# Patient Record
Sex: MALE | Race: WHITE | Employment: FULL TIME | URBAN - METROPOLITAN AREA
[De-identification: names, ages, dates, MRNs, and addresses within clinical notes are randomized per-mention and may not be internally consistent; named-entity substitution may affect disease eponyms.]

---

## 2020-04-30 ENCOUNTER — HOSPITAL ENCOUNTER (INPATIENT)
Age: 35
LOS: 6 days | Discharge: ANOTHER HOSPITAL | DRG: 287 | End: 2020-05-06
Attending: EMERGENCY MEDICINE | Admitting: HOSPITALIST
Payer: COMMERCIAL

## 2020-04-30 ENCOUNTER — APPOINTMENT (OUTPATIENT)
Dept: GENERAL RADIOLOGY | Age: 35
DRG: 287 | End: 2020-04-30
Attending: EMERGENCY MEDICINE
Payer: COMMERCIAL

## 2020-04-30 DIAGNOSIS — R07.9 CHEST PAIN: ICD-10-CM

## 2020-04-30 DIAGNOSIS — R06.09 DOE (DYSPNEA ON EXERTION): Primary | ICD-10-CM

## 2020-04-30 DIAGNOSIS — R73.9 HYPERGLYCEMIA: ICD-10-CM

## 2020-04-30 DIAGNOSIS — I50.23 ACUTE ON CHRONIC SYSTOLIC HEART FAILURE (HCC): ICD-10-CM

## 2020-04-30 DIAGNOSIS — R06.02 SOBOE (SHORTNESS OF BREATH ON EXERTION): ICD-10-CM

## 2020-04-30 DIAGNOSIS — R07.9 EXERTIONAL CHEST PAIN: ICD-10-CM

## 2020-04-30 DIAGNOSIS — I50.22 CHRONIC SYSTOLIC (CONGESTIVE) HEART FAILURE (HCC): ICD-10-CM

## 2020-04-30 PROBLEM — E11.9 TYPE 2 DIABETES MELLITUS (HCC): Status: ACTIVE | Noted: 2020-04-30

## 2020-04-30 LAB
ALBUMIN SERPL-MCNC: 3.7 G/DL (ref 3.4–5)
ALBUMIN/GLOB SERPL: 1.2 {RATIO} (ref 0.8–1.7)
ALP SERPL-CCNC: 79 U/L (ref 45–117)
ALT SERPL-CCNC: 62 U/L (ref 16–61)
ANION GAP SERPL CALC-SCNC: 2 MMOL/L (ref 3–18)
AST SERPL-CCNC: 25 U/L (ref 10–38)
ATRIAL RATE: 81 BPM
BASOPHILS # BLD: 0.1 K/UL (ref 0–0.1)
BASOPHILS NFR BLD: 1 % (ref 0–2)
BILIRUB SERPL-MCNC: 0.6 MG/DL (ref 0.2–1)
BNP SERPL-MCNC: 53 PG/ML (ref 0–450)
BUN SERPL-MCNC: 17 MG/DL (ref 7–18)
BUN/CREAT SERPL: 21 (ref 12–20)
CALCIUM SERPL-MCNC: 8.7 MG/DL (ref 8.5–10.1)
CALCULATED P AXIS, ECG09: 36 DEGREES
CALCULATED R AXIS, ECG10: -18 DEGREES
CALCULATED T AXIS, ECG11: -60 DEGREES
CHLORIDE SERPL-SCNC: 100 MMOL/L (ref 100–111)
CK MB CFR SERPL CALC: NORMAL % (ref 0–4)
CK MB CFR SERPL CALC: NORMAL % (ref 0–4)
CK MB SERPL-MCNC: <1 NG/ML (ref 5–25)
CK MB SERPL-MCNC: <1 NG/ML (ref 5–25)
CK SERPL-CCNC: 74 U/L (ref 39–308)
CK SERPL-CCNC: 80 U/L (ref 39–308)
CO2 SERPL-SCNC: 28 MMOL/L (ref 21–32)
CREAT SERPL-MCNC: 0.81 MG/DL (ref 0.6–1.3)
D DIMER PPP FEU-MCNC: 0.33 UG/ML(FEU)
DIAGNOSIS, 93000: NORMAL
DIFFERENTIAL METHOD BLD: ABNORMAL
EOSINOPHIL # BLD: 0.1 K/UL (ref 0–0.4)
EOSINOPHIL NFR BLD: 1 % (ref 0–5)
ERYTHROCYTE [DISTWIDTH] IN BLOOD BY AUTOMATED COUNT: 13.2 % (ref 11.6–14.5)
EST. AVERAGE GLUCOSE BLD GHB EST-MCNC: 312 MG/DL
GLOBULIN SER CALC-MCNC: 3.2 G/DL (ref 2–4)
GLUCOSE BLD STRIP.AUTO-MCNC: 255 MG/DL (ref 70–110)
GLUCOSE BLD STRIP.AUTO-MCNC: 313 MG/DL (ref 70–110)
GLUCOSE SERPL-MCNC: 344 MG/DL (ref 74–99)
HBA1C MFR BLD: 12.5 % (ref 4.2–5.6)
HCT VFR BLD AUTO: 43.9 % (ref 36–48)
HGB BLD-MCNC: 15.4 G/DL (ref 13–16)
LYMPHOCYTES # BLD: 1.6 K/UL (ref 0.9–3.6)
LYMPHOCYTES NFR BLD: 21 % (ref 21–52)
MAGNESIUM SERPL-MCNC: 2.2 MG/DL (ref 1.6–2.6)
MCH RBC QN AUTO: 28.5 PG (ref 24–34)
MCHC RBC AUTO-ENTMCNC: 35.1 G/DL (ref 31–37)
MCV RBC AUTO: 81.3 FL (ref 74–97)
MONOCYTES # BLD: 0.5 K/UL (ref 0.05–1.2)
MONOCYTES NFR BLD: 6 % (ref 3–10)
NEUTS SEG # BLD: 5.3 K/UL (ref 1.8–8)
NEUTS SEG NFR BLD: 71 % (ref 40–73)
P-R INTERVAL, ECG05: 148 MS
PLATELET # BLD AUTO: 245 K/UL (ref 135–420)
PMV BLD AUTO: 12.7 FL (ref 9.2–11.8)
POTASSIUM SERPL-SCNC: 3.6 MMOL/L (ref 3.5–5.5)
PROT SERPL-MCNC: 6.9 G/DL (ref 6.4–8.2)
Q-T INTERVAL, ECG07: 368 MS
QRS DURATION, ECG06: 94 MS
QTC CALCULATION (BEZET), ECG08: 427 MS
RBC # BLD AUTO: 5.4 M/UL (ref 4.7–5.5)
SODIUM SERPL-SCNC: 130 MMOL/L (ref 136–145)
TROPONIN I SERPL-MCNC: 0.02 NG/ML (ref 0–0.04)
TROPONIN I SERPL-MCNC: 0.04 NG/ML (ref 0–0.04)
VENTRICULAR RATE, ECG03: 81 BPM
WBC # BLD AUTO: 7.5 K/UL (ref 4.6–13.2)

## 2020-04-30 PROCEDURE — 74011636637 HC RX REV CODE- 636/637: Performed by: EMERGENCY MEDICINE

## 2020-04-30 PROCEDURE — 99218 HC RM OBSERVATION: CPT

## 2020-04-30 PROCEDURE — 71045 X-RAY EXAM CHEST 1 VIEW: CPT

## 2020-04-30 PROCEDURE — 65270000029 HC RM PRIVATE

## 2020-04-30 PROCEDURE — 83880 ASSAY OF NATRIURETIC PEPTIDE: CPT

## 2020-04-30 PROCEDURE — 74011636637 HC RX REV CODE- 636/637: Performed by: HOSPITALIST

## 2020-04-30 PROCEDURE — 36415 COLL VENOUS BLD VENIPUNCTURE: CPT

## 2020-04-30 PROCEDURE — 82962 GLUCOSE BLOOD TEST: CPT

## 2020-04-30 PROCEDURE — 94762 N-INVAS EAR/PLS OXIMTRY CONT: CPT

## 2020-04-30 PROCEDURE — 82550 ASSAY OF CK (CPK): CPT

## 2020-04-30 PROCEDURE — 85025 COMPLETE CBC W/AUTO DIFF WBC: CPT

## 2020-04-30 PROCEDURE — 99285 EMERGENCY DEPT VISIT HI MDM: CPT

## 2020-04-30 PROCEDURE — 93005 ELECTROCARDIOGRAM TRACING: CPT

## 2020-04-30 PROCEDURE — 83036 HEMOGLOBIN GLYCOSYLATED A1C: CPT

## 2020-04-30 PROCEDURE — 85379 FIBRIN DEGRADATION QUANT: CPT

## 2020-04-30 PROCEDURE — 74011250636 HC RX REV CODE- 250/636: Performed by: HOSPITALIST

## 2020-04-30 PROCEDURE — 96372 THER/PROPH/DIAG INJ SC/IM: CPT

## 2020-04-30 PROCEDURE — 80053 COMPREHEN METABOLIC PANEL: CPT

## 2020-04-30 PROCEDURE — 83735 ASSAY OF MAGNESIUM: CPT

## 2020-04-30 RX ORDER — INSULIN GLARGINE 100 [IU]/ML
5 INJECTION, SOLUTION SUBCUTANEOUS DAILY
Status: DISCONTINUED | OUTPATIENT
Start: 2020-05-01 | End: 2020-05-01

## 2020-04-30 RX ORDER — FUROSEMIDE 40 MG/1
40 TABLET ORAL DAILY
Status: DISCONTINUED | OUTPATIENT
Start: 2020-05-01 | End: 2020-04-30

## 2020-04-30 RX ORDER — NITROGLYCERIN 0.4 MG/1
0.4 TABLET SUBLINGUAL
Status: DISCONTINUED | OUTPATIENT
Start: 2020-04-30 | End: 2020-05-06 | Stop reason: HOSPADM

## 2020-04-30 RX ORDER — LOSARTAN POTASSIUM 50 MG/1
100 TABLET ORAL DAILY
Status: DISCONTINUED | OUTPATIENT
Start: 2020-05-01 | End: 2020-04-30

## 2020-04-30 RX ORDER — METOPROLOL SUCCINATE 50 MG/1
50 TABLET, EXTENDED RELEASE ORAL DAILY
Status: DISCONTINUED | OUTPATIENT
Start: 2020-05-01 | End: 2020-05-02

## 2020-04-30 RX ORDER — FUROSEMIDE 10 MG/ML
40 INJECTION INTRAMUSCULAR; INTRAVENOUS EVERY 12 HOURS
Status: DISCONTINUED | OUTPATIENT
Start: 2020-05-01 | End: 2020-05-04

## 2020-04-30 RX ORDER — MORPHINE SULFATE 2 MG/ML
1 INJECTION, SOLUTION INTRAMUSCULAR; INTRAVENOUS
Status: DISCONTINUED | OUTPATIENT
Start: 2020-04-30 | End: 2020-05-06 | Stop reason: HOSPADM

## 2020-04-30 RX ORDER — METOPROLOL SUCCINATE 50 MG/1
TABLET, EXTENDED RELEASE ORAL DAILY
COMMUNITY
End: 2020-05-06

## 2020-04-30 RX ORDER — ACETAMINOPHEN 325 MG/1
650 TABLET ORAL
Status: DISCONTINUED | OUTPATIENT
Start: 2020-04-30 | End: 2020-05-06 | Stop reason: HOSPADM

## 2020-04-30 RX ORDER — MAGNESIUM SULFATE 100 %
4 CRYSTALS MISCELLANEOUS AS NEEDED
Status: DISCONTINUED | OUTPATIENT
Start: 2020-04-30 | End: 2020-05-06 | Stop reason: HOSPADM

## 2020-04-30 RX ORDER — HEPARIN SODIUM 5000 [USP'U]/ML
5000 INJECTION, SOLUTION INTRAVENOUS; SUBCUTANEOUS EVERY 8 HOURS
Status: DISCONTINUED | OUTPATIENT
Start: 2020-04-30 | End: 2020-05-06 | Stop reason: HOSPADM

## 2020-04-30 RX ORDER — LOSARTAN POTASSIUM 50 MG/1
50 TABLET ORAL DAILY
Status: DISCONTINUED | OUTPATIENT
Start: 2020-05-01 | End: 2020-05-02

## 2020-04-30 RX ORDER — GUAIFENESIN 100 MG/5ML
81 LIQUID (ML) ORAL DAILY
Status: DISCONTINUED | OUTPATIENT
Start: 2020-05-01 | End: 2020-05-06 | Stop reason: HOSPADM

## 2020-04-30 RX ORDER — SPIRONOLACTONE 25 MG/1
25 TABLET ORAL DAILY
Status: DISCONTINUED | OUTPATIENT
Start: 2020-05-01 | End: 2020-05-06 | Stop reason: HOSPADM

## 2020-04-30 RX ORDER — INSULIN LISPRO 100 [IU]/ML
INJECTION, SOLUTION INTRAVENOUS; SUBCUTANEOUS
Status: DISCONTINUED | OUTPATIENT
Start: 2020-04-30 | End: 2020-05-06 | Stop reason: HOSPADM

## 2020-04-30 RX ADMIN — HUMAN INSULIN 10 UNITS: 100 INJECTION, SOLUTION SUBCUTANEOUS at 17:07

## 2020-04-30 RX ADMIN — INSULIN LISPRO 8 UNITS: 100 INJECTION, SOLUTION INTRAVENOUS; SUBCUTANEOUS at 21:47

## 2020-04-30 RX ADMIN — HEPARIN SODIUM 5000 UNITS: 5000 INJECTION INTRAVENOUS; SUBCUTANEOUS at 21:46

## 2020-04-30 NOTE — H&P
History & Physical    Patient: Jagdish Maldonado MRN: [id number]  CSN: [id number]    YOB: 1985  Age: 27 y.o. Sex: male      DOA: 4/30/2020  Primary Care Provider:  None      Assessment/Plan     Hospital Problems  Date Reviewed: 2/2/2015          Codes Class Noted POA    Chest pain ICD-10-CM: R07.9  ICD-9-CM: 786.50  4/30/2020 Unknown        Type 2 diabetes mellitus ([facility].) ICD-10-CM: E11.9  ICD-9-CM: 250.00  4/30/2020 Unknown        AICD (automatic cardioverter/defibrillator) present ICD-10-CM: Z95.810  ICD-9-CM: V45.02  9/19/2014 Yes    Overview Signed 9/19/2014  9:50 AM by Susy Iglesias MD     Dual chamber [employer] AICD for primary prevention 9/19/14             Essential hypertension, benign ICD-10-CM: I10  ICD-9-CM: 401.1  Unknown Yes        Cardiomyopathy ([facility] ICD-10-CM: I42.9  ICD-9-CM: 425.4  12/1/2011 Yes        CHF (congestive heart failure) ([facility].) ICD-10-CM: I50.9  ICD-9-CM: 428.0  12/1/2011 Yes                Admit to tele for obs     Chest pain-angina   Cardiac monitor, ce trend need to r/o acs   Ekg: no st change at this point,  Will give nc O2 , morphine /nitro prn for chest pain  Cardiology consult , echo   Last stress test several years ago   Need cath vs stress test will defer to cardiology     AICD and cardiomyopathy , chf combined diastolic and systolic   Will have echo  No aicd flares   chf stable   Continue lasix, arbs, bbb  And aspirin         DM type II , new? He denies has it , glucose was 400s in 2016  -received regular insulin in er   -start lantus  Check a1c , diabetic education   ssi, diabetic diet , hypoglycemia protocol         HTN, accelerated  Continue home medication. Hyponatremia -pseudo  Corrected 134-136, continue monitoring     Full code     Estimate  length of stay : 2-3 day    DVT : heparin   CC: chest pain        HPI:     Jagdish Maldonado is a 27 y.o. male with PMHX of hypertension, cardiomyopathy, AICD came to ER due to on and off chest pain for 2 weeks.   He reported he had chest pain on exertion, resolved per resting. Associated with shortness of breath. Chest pain located middle of the chest.  No radiation, it is dull pain. No leg swelling. He follow-up with Dr. Gus Cervantes cardiologist for this heart problem. He called cardiologist in the recommended to come to the hospital or office if symptoms become worse. Today, his chest pain became worsening while he was walking a dog. He denies any fever chills, no COVID 19 contact. His first troponin was normal, proBNP normal.  Chest  x-ray was clear. He was found glucose over 300. He denies any history of diabetes. He has no primary care physician. But he said he has a strong family history of diabetes. Visit Vitals  /73 (BP 1 Location: Right arm, BP Patient Position: At rest;Head of bed elevated (Comment degrees))   Pulse 81   Temp 98.5 °F (36.9 °C)   Resp 21   Ht 5' 3\" (1.6 m)   Wt 107 kg (236 lb)   SpO2 97%   BMI 41.81 kg/m²      O2 Device: Room air      Past Medical History:   Diagnosis Date    Bradycardia     Congestive heart failure, unspecified     with dilated cardiomyopathy, EF 15%    Elevated liver enzymes 2/2/2015    Essential hypertension, benign     Poor compliance 7/26/2014       Past Surgical History:   Procedure Laterality Date    HX HEART CATHETERIZATION      HX PACEMAKER      pacermaker/defib placed     fhx : DM -mother   Social History     Socioeconomic History    Marital status: SINGLE     Spouse name: Not on file    Number of children: Not on file    Years of education: Not on file    Highest education level: Not on file   Tobacco Use    Smoking status: Never Smoker    Smokeless tobacco: Never Used   Substance and Sexual Activity    Alcohol use: Yes     Comment: rarely    Drug use: No       Prior to Admission medications    Medication Sig Start Date End Date Taking? Authorizing Provider   metoprolol succinate (TOPROL-XL) 50 mg XL tablet Take  by mouth daily.    Yes Other, MD Gerson   losartan (COZAAR) 100 mg tablet Take 1 tablet by mouth daily. 1/8/15  Yes Paula Ramires MD   spironolactone (ALDACTONE) 25 mg tablet Take 1 Tab by mouth daily. 14  Yes Kip Shahid NP   furosemide (LASIX) 40 mg tablet Take 1 Tab by mouth daily. 14  Yes Kip Shahid NP       No Known Allergies    Review of Systems  Gen: No fever, chills, malaise, weight loss/gain. Heent: No headache, rhinorrhea, epistaxis, ear pain, hearing loss, sinus pain, neck pain/stiffness, sore throat. Heart: + chest pain, no palpitations, EMERSON, pnd, or orthopnea. Resp: No cough, hemoptysis, wheezing and +shortness of breath on exertion  GI: No nausea, vomiting, diarrhea, constipation, melena or hematochezia. : No urinary obstruction, dysuria or hematuria. Derm: No rash, new skin lesion or pruritis. Musc/skeletal: no bone or joint complains. Vasc: No edema, cyanosis or claudication. Endo: No heat/cold intolerance, no polyuria,polydipsia or polyphagia. Neuro: No unilateral weakness, numbness, tingling. No seizures. Heme: No easy bruising or bleeding. Physical Exam:     Physical Exam:  Visit Vitals  /73 (BP 1 Location: Right arm, BP Patient Position: At rest;Head of bed elevated (Comment degrees))   Pulse 81   Temp 98.5 °F (36.9 °C)   Resp 21   Ht 5' 3\" (1.6 m)   Wt 107 kg (236 lb)   SpO2 97%   BMI 41.81 kg/m²      O2 Device: Room air    Temp (24hrs), Av.5 °F (36.9 °C), Min:98.5 °F (36.9 °C), Max:98.5 °F (36.9 °C)    No intake/output data recorded. No intake/output data recorded. General:  Awake, cooperative, no distress. Head:  Normocephalic, without obvious abnormality, atraumatic. Eyes:  Conjunctivae/corneas clear, sclera anicteric, PERRL, EOMs intact. Nose: Nares normal. No drainage or sinus tenderness. Throat: Lips, mucosa, and tongue normal. .   Neck: Supple, symmetrical, trachea midline, no adenopathy. Lungs:   Clear to auscultation bilaterally. Heart:  Regular rate and rhythm, S1, S2 normal, no murmur, click, rub or gallop. Abdomen: Soft, non-tender. Bowel sounds normal. No masses,  No organomegaly. Extremities: Extremities normal, atraumatic, no cyanosis or edema. Pulses: 2+ and symmetric all extremities. Skin: Skin color-pink, texture, turgor normal. No rashes or lesions. Capillary refill normal    Neurologic: CNII-XII intact. No focal motor or sensory deficit.        Labs Reviewed:    BMP:   Lab Results   Component Value Date/Time     (L) 04/30/2020 04:15 PM    K 3.6 04/30/2020 04:15 PM     04/30/2020 04:15 PM    CO2 28 04/30/2020 04:15 PM    AGAP 2 (L) 04/30/2020 04:15 PM     (H) 04/30/2020 04:15 PM    BUN 17 04/30/2020 04:15 PM    CREA 0.81 04/30/2020 04:15 PM    GFRAA >60 04/30/2020 04:15 PM    GFRNA >60 04/30/2020 04:15 PM     CMP:   Lab Results   Component Value Date/Time     (L) 04/30/2020 04:15 PM    K 3.6 04/30/2020 04:15 PM     04/30/2020 04:15 PM    CO2 28 04/30/2020 04:15 PM    AGAP 2 (L) 04/30/2020 04:15 PM     (H) 04/30/2020 04:15 PM    BUN 17 04/30/2020 04:15 PM    CREA 0.81 04/30/2020 04:15 PM    GFRAA >60 04/30/2020 04:15 PM    GFRNA >60 04/30/2020 04:15 PM    CA 8.7 04/30/2020 04:15 PM    MG 2.2 04/30/2020 04:15 PM    ALB 3.7 04/30/2020 04:15 PM    TP 6.9 04/30/2020 04:15 PM    GLOB 3.2 04/30/2020 04:15 PM    AGRAT 1.2 04/30/2020 04:15 PM    SGOT 25 04/30/2020 04:15 PM    ALT 62 (H) 04/30/2020 04:15 PM     CBC:   Lab Results   Component Value Date/Time    WBC 7.5 04/30/2020 03:35 PM    HGB 15.4 04/30/2020 03:35 PM    HCT 43.9 04/30/2020 03:35 PM     04/30/2020 03:35 PM     All Cardiac Markers in the last 24 hours:   Lab Results   Component Value Date/Time    CPK 80 04/30/2020 04:15 PM    CKMB <1.0 04/30/2020 04:15 PM    CKND1  04/30/2020 04:15 PM     CALCULATION NOT PERFORMED WHEN RESULT IS BELOW LINEAR LIMIT    TROIQ 0.02 04/30/2020 04:15 PM     Recent Glucose Results:   Lab Results   Component Value Date/Time     (H) 04/30/2020 04:15 PM     ABG: No results found for: PH, PHI, PCO2, PCO2I, PO2, PO2I, HCO3, HCO3I, FIO2, FIO2I  COAGS: No results found for: APTT, PTP, INR, INREXT, INREXT  Liver Panel:   Lab Results   Component Value Date/Time    ALB 3.7 04/30/2020 04:15 PM    TP 6.9 04/30/2020 04:15 PM    GLOB 3.2 04/30/2020 04:15 PM    AGRAT 1.2 04/30/2020 04:15 PM    SGOT 25 04/30/2020 04:15 PM    ALT 62 (H) 04/30/2020 04:15 PM    AP 79 04/30/2020 04:15 PM     Pancreatic Markers: No results found for: AMYLPOCT, AML, LIPPOCT, LPSE    Xr Chest Port    Result Date: 4/30/2020  EXAM: XR CHEST PORT CLINICAL INDICATION/HISTORY: SOB -Additional: None COMPARISON: 9/20/14 TECHNIQUE: Portable frontal view of the chest _______________ FINDINGS: SUPPORT DEVICES: None. HEART AND MEDIASTINUM: Left chest wall AICD/pacemaker. Cardiomediastinal silhouette within normal limits. LUNGS AND PLEURAL SPACES: No dense consolidation, large effusion or pneumothorax. _______________     IMPRESSION: No acute cardiopulmonary abnormality.     Procedures/imaging: see electronic medical records for all procedures/Xrays and details which were not copied into this note but were reviewed prior to creation of Briana Miles MD, Internal Medicine     CC: None

## 2020-04-30 NOTE — Clinical Note
Improved with medication compliance.     Lab Results   Component Value Date    LDL 74 06/24/2019     (H) 12/20/2018     (H) 06/01/2018    HDL 45 06/24/2019    HDL 57 12/20/2018    HDL 52 06/01/2018    TRIG 98 06/24/2019       TRANSFER - OUT REPORT:     Verbal report given to: RN . Report consisted of patient's Situation, Background, Assessment and   Recommendations(SBAR). Opportunity for questions and clarification was provided. Patient transported with a Registered Nurse and [address] [place] [address] / [facility] [employer]. Patient transported to: Care unit.

## 2020-04-30 NOTE — ED PROVIDER NOTES
EMERGENCY DEPARTMENT HISTORY AND PHYSICAL EXAM    Date: 4/30/2020  Patient Name: Sneha Kan    History of Presenting Illness     Chief Complaint   Patient presents with    Shortness of Breath    Chest Pain         History Provided By: Patient    3:24 PM  Sneha Kan is a 27 y.o. male with PMHX of cardiomyopathy who presents to the emergency department C/O shortness of breath and chest pain. Patient states for the past 2 weeks he has had progressively worsening dyspnea on exertion and chest pain associated with it. He states normally he can walk his dog or go to 711 without the symptoms over the last 2 weeks are happening more frequently and getting more severe. The symptoms improve when he rests. He denies any fever, lower extremity edema, cough, sick contacts, recent travel. He had a tele-visit with his cardiologist yesterday who told him if symptoms are getting worse he should come to the emergency department. He reports he has been taking all of his medications as prescribed including his Lasix 3 times a day.      PCP: None    Current Facility-Administered Medications   Medication Dose Route Frequency Provider Last Rate Last Dose    [START ON 5/1/2020] aspirin chewable tablet 81 mg  81 mg Oral DAILY Ethyl Sever, MD        nitroglycerin (NITROSTAT) tablet 0.4 mg  0.4 mg SubLINGual Q5MIN PRN Ethyl Sever, MD        acetaminophen (TYLENOL) tablet 650 mg  650 mg Oral Q4H PRN Ethyl Sever, MD        morphine injection 1 mg  1 mg IntraVENous Q4H PRN Ethyl Sever, MD        heparin (porcine) injection 5,000 Units  5,000 Units SubCUTAneous Q8H Ethyl Sever, MD  Lendon Skeeter ON 5/1/2020] furosemide (LASIX) tablet 40 mg  40 mg Oral DAILY Ethyl Sever, MD  Lendon Skeeter ON 5/1/2020] losartan (COZAAR) tablet 100 mg  100 mg Oral DAILY Ethyl Sever, MD  Lendon Skeeter ON 5/1/2020] metoprolol succinate (TOPROL-XL) XL tablet 50 mg  50 mg Oral DAILY Ethyl Sever, MD        [START ON 5/1/2020] spironolactone (ALDACTONE) tablet 25 mg  25 mg Oral DAILY Nolan Herron Maria Mendoza MD        insulin lispro (HUMALOG) injection   SubCUTAneous AC&HS Mckenna Frausto MD        glucose chewable tablet 16 g  4 Tab Oral PRN Mckenna Frausto MD        glucagon [facility]) injection 1 mg  1 mg IntraMUSCular PRN Mckenna Frausto MD       [address] [place] [address] Taisha Rich ON 5/1/2020] insulin glargine (LANTUS) injection 5 Units  5 Units SubCUTAneous DAILY Mckenna Frausto MD         Current Outpatient Medications   Medication Sig Dispense Refill    metoprolol succinate (TOPROL-XL) 50 mg XL tablet Take  by mouth daily.  losartan (COZAAR) 100 mg tablet Take 1 tablet by mouth daily. 90 tablet 3    spironolactone (ALDACTONE) 25 mg tablet Take 1 Tab by mouth daily. 90 Tab 3    furosemide (LASIX) 40 mg tablet Take 1 Tab by mouth daily. 80 Tab 3       Past History     Past Medical History:  Past Medical History:   Diagnosis Date    Bradycardia     Congestive heart failure, unspecified     with dilated cardiomyopathy, EF 15%    Elevated liver enzymes 2/2/2015    Essential hypertension, benign     Poor compliance 7/26/2014       Past Surgical History:  Past Surgical History:   Procedure Laterality Date    HX HEART CATHETERIZATION      HX PACEMAKER      pacermaker/defib placed       Family History:  History reviewed. No pertinent family history. Social History:  Social History     Tobacco Use    Smoking status: Never Smoker    Smokeless tobacco: Never Used   Substance Use Topics    Alcohol use: Yes     Comment: rarely    Drug use: No       Allergies:  No Known Allergies      Review of Systems   Review of Systems   Constitutional: Negative for fever. Respiratory: Positive for shortness of breath. Negative for cough. Cardiovascular: Positive for chest pain. Negative for leg swelling. Gastrointestinal: Negative for abdominal pain. All other systems reviewed and are negative.         Physical Exam     Vitals:    04/30/20 1800 04/30/20 1815 04/30/20 1830 04/30/20 1845   BP: 142/90 (!) 150/98 137/89 (!) 128/103   Pulse: 80 77 69 82   Resp: 19 17 22 18   Temp:    97.1 °F (36.2 °C)   SpO2: 99% 98% 97% 98%   Weight:       Height:         Physical Exam    Nursing notes and vital signs reviewed    Constitutional: Non toxic appearing, mild distress  Head: Normocephalic, Atraumatic  Eyes: EOMI  Neck: Supple  Cardiovascular: Regular rate and rhythm, no murmurs, rubs, or gallops  Chest: Normal work of breathing and chest excursion bilaterally  Lungs: Diminished to ausculation bilaterally  Abdomen: Soft, non tender, non distended  Back: No evidence of trauma or deformity  Extremities: No evidence of trauma or deformity, no LE edema  Skin: Warm and dry, normal cap refill  Neuro: Alert and appropriate  Psychiatric: Normal mood and affect      Diagnostic Study Results     Labs -     Recent Results (from the past 12 hour(s))   EKG, 12 LEAD, INITIAL    Collection Time: 04/30/20  3:27 PM   Result Value Ref Range    Ventricular Rate 81 BPM    Atrial Rate 81 BPM    P-R Interval 148 ms    QRS Duration 94 ms    Q-T Interval 368 ms    QTC Calculation (Bezet) 427 ms    Calculated P Axis 36 degrees    Calculated R Axis -18 degrees    Calculated T Axis -60 degrees    Diagnosis       Normal sinus rhythm  Septal infarct , age undetermined  T wave abnormality, consider lateral ischemia  Abnormal ECG  When compared with ECG of 19-SEP-2014 06:28,  Septal infarct is now present     CBC WITH AUTOMATED DIFF    Collection Time: 04/30/20  3:35 PM   Result Value Ref Range    WBC 7.5 4.6 - 13.2 K/uL    RBC 5.40 4.70 - 5.50 M/uL    HGB 15.4 13.0 - 16.0 g/dL    HCT 43.9 36.0 - 48.0 %    MCV 81.3 74.0 - 97.0 FL    MCH 28.5 24.0 - 34.0 PG    MCHC 35.1 31.0 - 37.0 g/dL    RDW 13.2 11.6 - 14.5 %    PLATELET 553 282 - 397 K/uL    MPV 12.7 (H) 9.2 - 11.8 FL    NEUTROPHILS 71 40 - 73 %    LYMPHOCYTES 21 21 - 52 %    MONOCYTES 6 3 - 10 %    EOSINOPHILS 1 0 - 5 %    BASOPHILS 1 0 - 2 %    ABS. NEUTROPHILS 5.3 1.8 - 8.0 K/UL    ABS. LYMPHOCYTES 1.6 0.9 - 3.6 K/UL    ABS.  MONOCYTES 0.5 0.05 - 1.2 K/UL    ABS. EOSINOPHILS 0.1 0.0 - 0.4 K/UL    ABS. BASOPHILS 0.1 0.0 - 0.1 K/UL    DF AUTOMATED     D DIMER    Collection Time: 04/30/20  3:35 PM   Result Value Ref Range    D DIMER 0.33 <0.46 ug/ml(FEU)   CARDIAC PANEL,(CK, CKMB & TROPONIN)    Collection Time: 04/30/20  4:15 PM   Result Value Ref Range    CK 80 39 - 308 U/L    CK - MB <1.0 <3.6 ng/ml    CK-MB Index  0.0 - 4.0 %     CALCULATION NOT PERFORMED WHEN RESULT IS BELOW LINEAR LIMIT    Troponin-I, QT 0.02 0.0 - 0.045 NG/ML   MAGNESIUM    Collection Time: 04/30/20  4:15 PM   Result Value Ref Range    Magnesium 2.2 1.6 - 2.6 mg/dL   METABOLIC PANEL, COMPREHENSIVE    Collection Time: 04/30/20  4:15 PM   Result Value Ref Range    Sodium 130 (L) 136 - 145 mmol/L    Potassium 3.6 3.5 - 5.5 mmol/L    Chloride 100 100 - 111 mmol/L    CO2 28 21 - 32 mmol/L    Anion gap 2 (L) 3.0 - 18 mmol/L    Glucose 344 (H) 74 - 99 mg/dL    BUN 17 7.0 - 18 MG/DL    Creatinine 0.81 0.6 - 1.3 MG/DL    BUN/Creatinine ratio 21 (H) 12 - 20      GFR est AA >60 >60 ml/min/1.73m2    GFR est non-AA >60 >60 ml/min/1.73m2    Calcium 8.7 8.5 - 10.1 MG/DL    Bilirubin, total 0.6 0.2 - 1.0 MG/DL    ALT (SGPT) 62 (H) 16 - 61 U/L    AST (SGOT) 25 10 - 38 U/L    Alk. phosphatase 79 45 - 117 U/L    Protein, total 6.9 6.4 - 8.2 g/dL    Albumin 3.7 3.4 - 5.0 g/dL    Globulin 3.2 2.0 - 4.0 g/dL    A-G Ratio 1.2 0.8 - 1.7     NT-PRO BNP    Collection Time: 04/30/20  4:15 PM   Result Value Ref Range    NT pro-BNP 53 0 - 450 PG/ML   HEMOGLOBIN A1C WITH EAG    Collection Time: 04/30/20  4:15 PM   Result Value Ref Range    Hemoglobin A1c 12.5 (H) 4.2 - 5.6 %    Est. average glucose 312 mg/dL   GLUCOSE, POC    Collection Time: 04/30/20  6:44 PM   Result Value Ref Range    Glucose (POC) 255 (H) 70 - 110 mg/dL       Radiologic Studies -   XR CHEST PORT   Final Result   IMPRESSION:      No acute cardiopulmonary abnormality.         CT Results  (Last 48 hours)    None        CXR Results  (Last 48 hours) 04/30/20 1557  XR CHEST PORT Final result    Impression:  IMPRESSION:       No acute cardiopulmonary abnormality. Narrative:  EXAM: XR CHEST PORT       CLINICAL INDICATION/HISTORY: SOB   -Additional: None       COMPARISON: 9/20/14       TECHNIQUE: Portable frontal view of the chest       _______________       FINDINGS:       SUPPORT DEVICES: None. HEART AND MEDIASTINUM: Left chest wall AICD/pacemaker. Cardiomediastinal   silhouette within normal limits. LUNGS AND PLEURAL SPACES: No dense consolidation, large effusion or   pneumothorax.       _______________                 Medications given in the ED-  Medications   aspirin chewable tablet 81 mg (has no administration in time range)   nitroglycerin (NITROSTAT) tablet 0.4 mg (has no administration in time range)   acetaminophen (TYLENOL) tablet 650 mg (has no administration in time range)   morphine injection 1 mg (has no administration in time range)   heparin (porcine) injection 5,000 Units (has no administration in time range)   furosemide (LASIX) tablet 40 mg (has no administration in time range)   losartan (COZAAR) tablet 100 mg (has no administration in time range)   metoprolol succinate (TOPROL-XL) XL tablet 50 mg (has no administration in time range)   spironolactone (ALDACTONE) tablet 25 mg (has no administration in time range)   insulin lispro (HUMALOG) injection (has no administration in time range)   glucose chewable tablet 16 g (has no administration in time range)   glucagon (GLUCAGEN) injection 1 mg (has no administration in time range)   insulin glargine (LANTUS) injection 5 Units (has no administration in time range)   insulin regular (NOVOLIN R, HUMULIN R) injection 10 Units (10 Units SubCUTAneous Given 4/30/20 1707)         Medical Decision Making   I am the first provider for this patient.     I reviewed the vital signs, available nursing notes, past medical history, past surgical history, family history and social history. Vital Signs-Reviewed the patient's vital signs. Pulse Oximetry Analysis -98 % on room air    Cardiac Monitor:  Rate: 77 bpm  Rhythm: Normal sinus    EKG interpretation: (Preliminary)  EKG read by Dr. Jamar Chua at 3:31 PM  Normal sinus rhythm at a rate of 81 bpm, SD interval 148 ms, QRS duration of 94 ms    Records Reviewed: Nursing Notes, Old Medical Records and Previous electrocardiograms    Provider Notes (Medical Decision Making): Gorman Burkitt is a 27 y.o. male ending for progressively worsening dyspnea on exertion and chest pain with exertion over the past 2 weeks. Patient has significant cardiomyopathy history but reports compliance with all his medications. Glucose significantly elevated here without evidence of DKA and patient denies prior diagnosis of diabetes. Discussed with cardiology and hospitalist for further observation in hospital for evaluation and management. Patient understands and agrees with this plan. Procedures:  Procedures    ED Course:   CONSULT NOTE:   5:39 PM  Dr. Jamar Chua spoke with Dr. Osiel Bell   Specialty: Cardiology  Discussed pt's hx, disposition, and available diagnostic and imaging results over the telephone. Reviewed care plans. Observe overnight. 5:42 PM  Updated patient on all results and plan. All questions answered. CONSULT NOTE:   5:49 PM  Dr. Jamar Chua spoke with Dr. Jelly Jaime  Specialty: Hospitalist  Discussed pt's hx, disposition, and available diagnostic and imaging results over the telephone. Reviewed care plans. Accepts for observation on telemetry. Diagnosis and Disposition     Critical Care Time: None    Core Measures:  For Hospitalized Patients:    1. Hospitalization Decision Time:  The decision to hospitalize the patient was made by Dr. Jamar Chua at 5:39 PM on 4/30/2020    2.  Aspirin: Aspirin was not given because the patient did not present with a stroke at the time of their Emergency Department evaluation    5:48 PM Patient is being admitted to the hospital by Dr. Alessandro Lopez. The results of their tests and reasons for their admission have been discussed with them and/or available family. They convey agreement and understanding for the need to be admitted and for their admission diagnosis. CONDITIONS ON ADMISSION:  Sepsis is not present at the time of admission. Deep Vein Thrombosis is not present at the time of admission. Thrombosis is not present at the time of admission. Urinary Tract Infection is not present at the time of admission. Pneumonia is not present at the time of admission. MRSA is not present at the time of admission. Wound infection is not present at the time of admission. Pressure Ulcer is not present at the time of admission. CLINICAL IMPRESSION:    1. EMERSON (dyspnea on exertion)    2. Hyperglycemia    3. Exertional chest pain      _______________________________      Please note that this dictation was completed with [employer], the computer voice recognition software. Quite often unanticipated grammatical, syntax, homophones, and other interpretive errors are inadvertently transcribed by the computer software. Please disregard these errors. Please excuse any errors that have escaped final proofreading.

## 2020-04-30 NOTE — ROUTINE PROCESS
1854 TRANSFER - IN REPORT: 
 
TRANSFER - IN REPORT: 
 
Verbal report received from Oli Santos (name) on Gerson Charlton  being received from ED (unit) for routine progression of care Report consisted of patients Situation, Background, Assessment and  
Recommendations(SBAR). Information from the following report(s) SBAR, Kardex, [facility] and Cardiac Rhythm SR was reviewed with the receiving nurse. Opportunity for questions and clarification was provided. Assessment completed upon patients arrival to unit and care assumed. 1912 Pt arrived on the unit. Pt oriented to call bell. No c/o sob or chest pain. Call bell within reach 
 
1920 Bedside and Verbal shift change report given to Fede Arciniega RN (oncoming nurse) by Isabel Albert RN (offgoing nurse). Report included the following information SBAR, Kardex, Intake/Output, MAR, Recent Results and Cardiac Rhythm .

## 2020-04-30 NOTE — ED NOTES
TRANSFER - OUT REPORT:    Verbal report given to Ailyn Bo RN(name) on Getachew Marcelino  being transferred to [facility](unit) for routine progression of care       Report consisted of patients Situation, Background, Assessment and   Recommendations(SBAR). Information from the following report(s) SBAR, Kardex, ED Summary, [facility] and Recent Results was reviewed with the receiving nurse. Lines:   Peripheral IV 04/30/20 Left Forearm (Active)   Site Assessment Clean, dry, & intact 4/30/2020  3:41 PM   Phlebitis Assessment 0 4/30/2020  3:41 PM   Infiltration Assessment 0 4/30/2020  3:41 PM   Dressing Status Clean, dry, & intact 4/30/2020  3:41 PM   Dressing Type Transparent;Tape 4/30/2020  3:41 PM   Hub Color/Line Status Pink;Flushed;Patent 4/30/2020  3:41 PM   Action Taken Blood drawn 4/30/2020  3:41 PM   Alcohol Cap Used Yes 4/30/2020  3:41 PM        Opportunity for questions and clarification was provided.       Patient transported with:   Monitor  Registered Nurse

## 2020-04-30 NOTE — ED TRIAGE NOTES
Patient with complaints of shortness of breath with activity and chest pain.  Patient states he was told to come here by Dr. Hou Boehringer

## 2020-04-30 NOTE — Clinical Note
TRANSFER - IN REPORT:     Verbal report received from: rn.     Report consisted of patient's Situation, Background, Assessment and   Recommendations(SBAR). Opportunity for questions and clarification was provided. Assessment completed upon patient's arrival to unit and care assumed. Patient transported with a Registered Nurse and [address] [place] [address] / [facility] [employer].

## 2020-05-01 ENCOUNTER — APPOINTMENT (OUTPATIENT)
Dept: NON INVASIVE DIAGNOSTICS | Age: 35
DRG: 287 | End: 2020-05-01
Attending: HOSPITALIST
Payer: COMMERCIAL

## 2020-05-01 PROBLEM — E11.65 HYPERGLYCEMIA DUE TO TYPE 2 DIABETES MELLITUS (HCC): Status: ACTIVE | Noted: 2020-04-30

## 2020-05-01 LAB
ANION GAP SERPL CALC-SCNC: 4 MMOL/L (ref 3–18)
BUN SERPL-MCNC: 17 MG/DL (ref 7–18)
BUN/CREAT SERPL: 19 (ref 12–20)
CALCIUM SERPL-MCNC: 8.5 MG/DL (ref 8.5–10.1)
CHLORIDE SERPL-SCNC: 99 MMOL/L (ref 100–111)
CHOLEST SERPL-MCNC: 218 MG/DL
CK MB CFR SERPL CALC: NORMAL % (ref 0–4)
CK MB CFR SERPL CALC: NORMAL % (ref 0–4)
CK MB SERPL-MCNC: <1 NG/ML (ref 5–25)
CK MB SERPL-MCNC: <1 NG/ML (ref 5–25)
CK SERPL-CCNC: 59 U/L (ref 39–308)
CK SERPL-CCNC: 65 U/L (ref 39–308)
CO2 SERPL-SCNC: 33 MMOL/L (ref 21–32)
CREAT SERPL-MCNC: 0.88 MG/DL (ref 0.6–1.3)
GLUCOSE BLD STRIP.AUTO-MCNC: 255 MG/DL (ref 70–110)
GLUCOSE BLD STRIP.AUTO-MCNC: 258 MG/DL (ref 70–110)
GLUCOSE BLD STRIP.AUTO-MCNC: 262 MG/DL (ref 70–110)
GLUCOSE BLD STRIP.AUTO-MCNC: 377 MG/DL (ref 70–110)
GLUCOSE SERPL-MCNC: 239 MG/DL (ref 74–99)
HDLC SERPL-MCNC: 27 MG/DL (ref 40–60)
HDLC SERPL: 8.1 {RATIO} (ref 0–5)
LDLC SERPL CALC-MCNC: 142.2 MG/DL (ref 0–100)
LIPID PROFILE,FLP: ABNORMAL
POTASSIUM SERPL-SCNC: 3.6 MMOL/L (ref 3.5–5.5)
SODIUM SERPL-SCNC: 136 MMOL/L (ref 136–145)
TRIGL SERPL-MCNC: 244 MG/DL (ref ?–150)
TROPONIN I SERPL-MCNC: 0.02 NG/ML (ref 0–0.04)
TROPONIN I SERPL-MCNC: 0.04 NG/ML (ref 0–0.04)
VLDLC SERPL CALC-MCNC: 48.8 MG/DL

## 2020-05-01 PROCEDURE — 36415 COLL VENOUS BLD VENIPUNCTURE: CPT

## 2020-05-01 PROCEDURE — 74011250637 HC RX REV CODE- 250/637: Performed by: INTERNAL MEDICINE

## 2020-05-01 PROCEDURE — 74011250636 HC RX REV CODE- 250/636: Performed by: INTERNAL MEDICINE

## 2020-05-01 PROCEDURE — 82550 ASSAY OF CK (CPK): CPT

## 2020-05-01 PROCEDURE — 74011250636 HC RX REV CODE- 250/636: Performed by: HOSPITALIST

## 2020-05-01 PROCEDURE — 82962 GLUCOSE BLOOD TEST: CPT

## 2020-05-01 PROCEDURE — 74011250637 HC RX REV CODE- 250/637: Performed by: HOSPITALIST

## 2020-05-01 PROCEDURE — 80048 BASIC METABOLIC PNL TOTAL CA: CPT

## 2020-05-01 PROCEDURE — 96372 THER/PROPH/DIAG INJ SC/IM: CPT

## 2020-05-01 PROCEDURE — 96376 TX/PRO/DX INJ SAME DRUG ADON: CPT

## 2020-05-01 PROCEDURE — 65270000029 HC RM PRIVATE

## 2020-05-01 PROCEDURE — 80061 LIPID PANEL: CPT

## 2020-05-01 PROCEDURE — 74011636637 HC RX REV CODE- 636/637: Performed by: HOSPITALIST

## 2020-05-01 PROCEDURE — 96374 THER/PROPH/DIAG INJ IV PUSH: CPT

## 2020-05-01 PROCEDURE — C8929 TTE W OR WO FOL WCON,DOPPLER: HCPCS

## 2020-05-01 PROCEDURE — 99218 HC RM OBSERVATION: CPT

## 2020-05-01 RX ORDER — INSULIN LISPRO 100 [IU]/ML
5 INJECTION, SOLUTION INTRAVENOUS; SUBCUTANEOUS
Status: DISCONTINUED | OUTPATIENT
Start: 2020-05-01 | End: 2020-05-04

## 2020-05-01 RX ORDER — INSULIN GLARGINE 100 [IU]/ML
15 INJECTION, SOLUTION SUBCUTANEOUS DAILY
Status: DISCONTINUED | OUTPATIENT
Start: 2020-05-02 | End: 2020-05-03

## 2020-05-01 RX ORDER — INSULIN GLARGINE 100 [IU]/ML
5 INJECTION, SOLUTION SUBCUTANEOUS ONCE
Status: COMPLETED | OUTPATIENT
Start: 2020-05-01 | End: 2020-05-01

## 2020-05-01 RX ADMIN — HEPARIN SODIUM 5000 UNITS: 5000 INJECTION INTRAVENOUS; SUBCUTANEOUS at 06:50

## 2020-05-01 RX ADMIN — INSULIN LISPRO 8 UNITS: 100 INJECTION, SOLUTION INTRAVENOUS; SUBCUTANEOUS at 11:52

## 2020-05-01 RX ADMIN — INSULIN LISPRO 6 UNITS: 100 INJECTION, SOLUTION INTRAVENOUS; SUBCUTANEOUS at 16:57

## 2020-05-01 RX ADMIN — PERFLUTREN 1 ML: 6.52 INJECTION, SUSPENSION INTRAVENOUS at 08:48

## 2020-05-01 RX ADMIN — ASPIRIN 81 MG 81 MG: 81 TABLET ORAL at 09:29

## 2020-05-01 RX ADMIN — INSULIN GLARGINE 5 UNITS: 100 INJECTION, SOLUTION SUBCUTANEOUS at 16:58

## 2020-05-01 RX ADMIN — INSULIN LISPRO 5 UNITS: 100 INJECTION, SOLUTION INTRAVENOUS; SUBCUTANEOUS at 16:58

## 2020-05-01 RX ADMIN — SPIRONOLACTONE 25 MG: 25 TABLET ORAL at 09:29

## 2020-05-01 RX ADMIN — INSULIN LISPRO 6 UNITS: 100 INJECTION, SOLUTION INTRAVENOUS; SUBCUTANEOUS at 21:46

## 2020-05-01 RX ADMIN — HEPARIN SODIUM 5000 UNITS: 5000 INJECTION INTRAVENOUS; SUBCUTANEOUS at 14:07

## 2020-05-01 RX ADMIN — FUROSEMIDE 40 MG: 10 INJECTION, SOLUTION INTRAMUSCULAR; INTRAVENOUS at 21:47

## 2020-05-01 RX ADMIN — HEPARIN SODIUM 5000 UNITS: 5000 INJECTION INTRAVENOUS; SUBCUTANEOUS at 22:11

## 2020-05-01 RX ADMIN — METOPROLOL SUCCINATE 50 MG: 50 TABLET, EXTENDED RELEASE ORAL at 09:30

## 2020-05-01 RX ADMIN — FUROSEMIDE 40 MG: 10 INJECTION, SOLUTION INTRAMUSCULAR; INTRAVENOUS at 09:31

## 2020-05-01 RX ADMIN — LOSARTAN POTASSIUM 50 MG: 50 TABLET, FILM COATED ORAL at 09:29

## 2020-05-01 RX ADMIN — INSULIN GLARGINE 5 UNITS: 100 INJECTION, SOLUTION SUBCUTANEOUS at 09:30

## 2020-05-01 RX ADMIN — INSULIN LISPRO 6 UNITS: 100 INJECTION, SOLUTION INTRAVENOUS; SUBCUTANEOUS at 06:52

## 2020-05-01 NOTE — ROUTINE PROCESS
Bedside shift change report given to Michelle Jo RN (oncoming nurse) by Magdy Cintron RN (offgoing nurse). Report included the following information SBAR, Kardex, Intake/Output and MAR.

## 2020-05-01 NOTE — PROGRESS NOTES
Hospitalist Progress Note-critical care note     Patient: Marycarmen Weeks MRN: [id number]  CSN: [id number]    YOB: 1985  Age: 27 y.o. Sex: male    DOA: 4/30/2020 LOS:  LOS: 0 days            Chief complaint: chest pain , dm type II, HTN, cardiomyopathy. chf     Assessment/Plan         Hospital Problems  Date Reviewed: 2/2/2015          Codes Class Noted POA    * (Principal) Chest pain ICD-10-CM: R07.9  ICD-9-CM: 786.50  4/30/2020 Unknown        Hyperglycemia due to type 2 diabetes mellitus ([facility] [address] ICD-10-CM: E11.65  ICD-9-CM: 250.00  4/30/2020         AICD (automatic cardioverter/defibrillator) present ICD-10-CM: Z95.810  ICD-9-CM: V45.02  9/19/2014 Yes    Overview Signed 9/19/2014  9:50 AM by Vipin Rudd MD     Dual chamber Medtronic AICD for primary prevention 9/19/14             Essential hypertension, benign ICD-10-CM: I10  ICD-9-CM: 401.1  Unknown Yes        Cardiomyopathy ([facility] [address] ICD-10-CM: I42.9  ICD-9-CM: 425.4  12/1/2011 Yes        Combined systolic and diastolic congestive heart failure ([facility] ICD-10-CM: I50.40  ICD-9-CM: 428.40  12/1/2011 Yes              Chest pain-angina   No chest pain overnight   No acs   Echo done ,  Case discussed with Dr. Stevie Pacheco     AICD and cardiomyopathy , chf combined diastolic and systolic   Echo done -results pending   No aicd flares   chf stable   Continue lasix, arbs, bbb  And aspirin            DM type II , hyperglycemia   -increase lantus to 15. premeal 5   ssi, diabetic diet , hypoglycemia protocol             HTN, accelerated  Continue home medication.     Hyponatremia -pseudo  Resolved     Subjective: no chest pain   rn : took his own meds AM     Disposition :tbd,   Review of systems:    General: No fevers or chills. Cardiovascular: No chest pain or pressure. No palpitations. Pulmonary: No shortness of breath. Gastrointestinal: No nausea, vomiting.      Vital signs/Intake and Output:  Visit Vitals  /83   Pulse 82   Temp 97.7 °F (36.5 °C)   Resp 18 Ht 5' 3\" (1.6 m)   Wt 96.6 kg (213 lb)   SpO2 99%   BMI 37.73 kg/m²     Current Shift:  05/01 0701 - 05/01 1900  In: 240 [P.O.:240]  Out: 1050 [Urine:1050]  Last three shifts:  04/29 1901 - 05/01 0700  In: 480 [P.O.:480]  Out: 0     Physical Exam:  General: WD, WN. Alert, cooperative, no acute distress    HEENT: NC, Atraumatic. PERRLA, anicteric sclerae. Lungs: CTA Bilaterally. No Wheezing/Rhonchi/Rales. Heart:  Regular  rhythm,  No murmur, No Rubs, No Gallops  Abdomen: Soft, Non distended, Non tender. +Bowel sounds,   Extremities: No c/c/e  Psych:   Not anxious or agitated. Neurologic:  No acute neurological deficit. Labs: Results:       Chemistry Recent Labs     05/01/20  0450 04/30/20  1615   * 344*    130*   K 3.6 3.6   CL 99* 100   CO2 33* 28   BUN 17 17   CREA 0.88 0.81   CA 8.5 8.7   AGAP 4 2*   BUCR 19 21*   AP  --  79   TP  --  6.9   ALB  --  3.7   GLOB  --  3.2   AGRAT  --  1.2      CBC w/Diff Recent Labs     04/30/20  1535   WBC 7.5   RBC 5.40   HGB 15.4   HCT 43.9      GRANS 71   LYMPH 21   EOS 1      Cardiac Enzymes Recent Labs     05/01/20  1115 05/01/20  0450   CPK 65 59   CKND1 CALCULATION NOT PERFORMED WHEN RESULT IS BELOW LINEAR LIMIT CALCULATION NOT PERFORMED WHEN RESULT IS BELOW LINEAR LIMIT      Coagulation No results for input(s): PTP, INR, APTT, INREXT in the last 72 hours. Lipid Panel Lab Results   Component Value Date/Time    Cholesterol, total 218 (H) 05/01/2020 04:50 AM    HDL Cholesterol 27 (L) 05/01/2020 04:50 AM    LDL, calculated 142.2 (H) 05/01/2020 04:50 AM    VLDL, calculated 48.8 05/01/2020 04:50 AM    Triglyceride 244 (H) 05/01/2020 04:50 AM    CHOL/HDL Ratio 8.1 (H) 05/01/2020 04:50 AM      BNP No results for input(s): BNPP in the last 72 hours.    Liver Enzymes Recent Labs     04/30/20  1615   TP 6.9   ALB 3.7   AP 79   SGOT 25      Thyroid Studies Lab Results   Component Value Date/Time    TSH 1.54 11/17/2011 01:20 AM Procedures/imaging: see electronic medical records for all procedures/Xrays and details which were not copied into this note but were reviewed prior to creation of Plan    Xr Chest Port    Result Date: 4/30/2020  EXAM: XR CHEST PORT CLINICAL INDICATION/HISTORY: SOB -Additional: None COMPARISON: 9/20/14 TECHNIQUE: Portable frontal view of the chest _______________ FINDINGS: SUPPORT DEVICES: None. HEART AND MEDIASTINUM: Left chest wall AICD/pacemaker. Cardiomediastinal silhouette within normal limits. LUNGS AND PLEURAL SPACES: No dense consolidation, large effusion or pneumothorax. _______________     IMPRESSION: No acute cardiopulmonary abnormality.       Chelle Murray MD

## 2020-05-01 NOTE — PROGRESS NOTES
Reason for Admission:   Sharee Velasco is a 27 y.o. male with PMHX of cardiomyopathy who presents to the emergency department C/O shortness of breath and chest pain. Patient states for the past 2 weeks he has had progressively worsening dyspnea on exertion and chest pain associated with it. He states normally he can walk his dog or go to [address] without the symptoms over the last 2 weeks are happening more frequently and getting more severe. The symptoms improve when he rests. He denies any fever, lower extremity edema, cough, sick contacts, recent travel. He had a tele-visit with his cardiologist yesterday who told him if symptoms are getting worse he should come to the emergency department. He reports he has been taking all of his medications as prescribed including his Lasix 3 times a day. RUR Score:    Not on chart                 Plan for utilizing home health:      no    PCP: First and Last name:  Does not have a pcp   Name of Practice:    Are you a current patient: Yes/No:    Approximate date of last visit:                     Current Advanced Directive/Advance Care Plan: fani declines need of assistance with acp however states he is is aware that it would be his mother since she is his next of kin                         Transition of Care Plan:        Did not enter room due to covid restrictions . Cm did call patient on the phone. He reports he lives with his sister. Reports he does not use [facility]. He is [employer]AD[employer], he has [employer] for insurance. Reports he does not want to address acp. Reports if anything happens to him his mother will make his decisions. reports his mother will drive him home. Verified address on EMR  He does not have pcp. Cms will assist with pcp.   Cm will continue to follow  Care Management Interventions  PCP Verified by CM: No(cms will assist)  Transition of Care Consult (CM Consult): Discharge Planning  Current Support Network: Relative's Home  Confirm Follow Up Transport: Family  The Plan for Transition of Care is Related to the Following Treatment Goals : anticipate d/c home when medically cleared  The Patient and/or Patient Representative was Provided with a Choice of Provider and Agrees with the Discharge Plan?: Yes  Freedom of Choice List was Provided with Basic Dialogue that Supports the Patient's Individualized Plan of Care/Goals, Treatment Preferences and Shares the Quality Data Associated with the Providers?: Yes  Discharge Location  Discharge Placement: Home with family assistance

## 2020-05-01 NOTE — PROGRESS NOTES
Problem: Falls - Risk of  Goal: *Absence of Falls  Description: Document Brody Syeda Fall Risk and appropriate interventions in the flowsheet.   Outcome: Progressing Towards Goal  Note: Fall Risk Interventions:            Medication Interventions: Evaluate medications/consider consulting pharmacy, Teach patient to arise slowly                   Problem: Patient Education: Go to Patient Education Activity  Goal: Patient/Family Education  Outcome: Progressing Towards Goal

## 2020-05-01 NOTE — PROGRESS NOTES
1920 Pt received from offgoing nurse without any signs or symptoms of distress. Pt vitals are stable and within normal limits. Pt bed in low position with wheels locked and call bell within reach. 1948 Assessment completed and documented in flow sheet. Pt denies any further needs at this time. Pt in NAD with bed in low position, wheels locked and call bell within reach. Primary Nurse Kvng Mims, MECCA and KIA Shell RN, RN performed a dual skin assessment on this patient No impairment noted  Diego score is 23  Purposeful rounding completed. Pt resting quietly. No further needs voiced at this time. 2146 Scheduled medications administered as ordered. Purposeful rounding completed. Pt resting quietly. No further needs voiced at this time. 0030 Purposeful rounding completed. Pt resting quietly. No further needs voiced at this time. 0230 Purposeful rounding completed. Pt resting quietly. No further needs voiced at this time. 5744 Reassessment completed with no changes noted. Bed locked, in lowest position, with call light within reach. Purposeful rounding completed. Pt resting quietly. No further needs voiced at this time.

## 2020-05-01 NOTE — PROGRESS NOTES
0700: Assumed care of pt from [facility].  4724: Entered pt room to give medication with Armando Levine RN. Pt stated he took all his medication his morning ( Home medication). This RN and Armando Levine RN reviewed medication at bedside as those that were ordered by MD. Educated pt not to take home medication anymore til discharge. Pt stated he understood. Will notify MD.  [phone]: Pt rounded on medication given tray taken. Needs met. 1845: Dr. Toan Wong stated pt will be here over the weekend Stress test on Monday.

## 2020-05-02 PROBLEM — E78.5 HYPERLIPEMIA: Status: ACTIVE | Noted: 2020-05-02

## 2020-05-02 PROBLEM — E66.01 MORBID OBESITY (HCC): Status: ACTIVE | Noted: 2020-05-02

## 2020-05-02 LAB
ANION GAP SERPL CALC-SCNC: 5 MMOL/L (ref 3–18)
AV VELOCITY RATIO: 0.51
AV VTI RATIO: 0.5
BUN SERPL-MCNC: 14 MG/DL (ref 7–18)
BUN/CREAT SERPL: 15 (ref 12–20)
CALCIUM SERPL-MCNC: 9.5 MG/DL (ref 8.5–10.1)
CHLORIDE SERPL-SCNC: 95 MMOL/L (ref 100–111)
CO2 SERPL-SCNC: 32 MMOL/L (ref 21–32)
CREAT SERPL-MCNC: 0.93 MG/DL (ref 0.6–1.3)
ECHO AO ASC DIAM: 3.26 CM
ECHO AV AREA PEAK VELOCITY: 1.7 CM2
ECHO AV AREA VTI: 1.6 CM2
ECHO AV AREA/BSA PEAK VELOCITY: 0.8 CM2/M2
ECHO AV AREA/BSA VTI: 0.7 CM2/M2
ECHO AV MEAN GRADIENT: 2.9 MMHG
ECHO AV MEAN VELOCITY: 0.81 M/S
ECHO AV PEAK GRADIENT: 5.5 MMHG
ECHO AV PEAK VELOCITY: 117.42 CM/S
ECHO AV VTI: 21.51 CM
ECHO IVC PROX: 1.65 CM
ECHO LA AREA 2C: 13.32 CM2
ECHO LA AREA 4C: 12.5 CM2
ECHO LA VOL 2C: 34.26 ML (ref 18–58)
ECHO LA VOL 4C: 28.37 ML (ref 18–58)
ECHO LA VOL BP: 33.29 ML (ref 18–58)
ECHO LA VOLUME INDEX A2C: 17.25 ML/M2 (ref 16–28)
ECHO LA VOLUME INDEX A4C: 14.28 ML/M2 (ref 16–28)
ECHO LV E' LATERAL VELOCITY: 7 CM/S
ECHO LV E' SEPTAL VELOCITY: 6 CM/S
ECHO LV EDV A2C: 97.2 ML
ECHO LV EDV A4C: 156.5 ML
ECHO LV EDV BP: 124.6 ML (ref 67–155)
ECHO LV EDV INDEX A4C: 78.8 ML/M2
ECHO LV EDV INDEX BP: 62.7 ML/M2
ECHO LV EDV NDEX A2C: 48.9 ML/M2
ECHO LV EDV TEICHHOLZ: 1.27 ML
ECHO LV EJECTION FRACTION A2C: 28 %
ECHO LV EJECTION FRACTION A4C: 31 %
ECHO LV EJECTION FRACTION BIPLANE: 29.3 % (ref 55–100)
ECHO LV ESV A2C: 69.7 ML
ECHO LV ESV A4C: 108.5 ML
ECHO LV ESV BP: 88.1 ML (ref 22–58)
ECHO LV ESV INDEX A2C: 35.1 ML/M2
ECHO LV ESV INDEX A4C: 54.6 ML/M2
ECHO LV ESV INDEX BP: 44.4 ML/M2
ECHO LV ESV TEICHHOLZ: 0.99 ML
ECHO LV INTERNAL DIMENSION DIASTOLIC: 6.33 CM (ref 4.2–5.9)
ECHO LV INTERNAL DIMENSION SYSTOLIC: 5.67 CM
ECHO LV IVSD: 0.93 CM (ref 0.6–1)
ECHO LV POSTERIOR WALL DIASTOLIC: 1.03 CM (ref 0.6–1)
ECHO LVOT DIAM: 2.04 CM
ECHO LVOT PEAK GRADIENT: 1.4 MMHG
ECHO LVOT PEAK VELOCITY: 60.19 CM/S
ECHO LVOT VTI: 10.35 CM
ECHO MV A VELOCITY: 59 CM/S
ECHO MV AREA PHT: 3.2 CM2
ECHO MV E DECELERATION TIME (DT): 234.7 MS
ECHO MV E VELOCITY: 69.05 CM/S
ECHO MV E/A RATIO: 1.17
ECHO MV E/E' LATERAL: 9.86
ECHO MV E/E' RATIO (AVERAGED): 10.69
ECHO MV E/E' SEPTAL: 11.51
ECHO MV PRESSURE HALF TIME (PHT): 68.1 MS
ECHO RA AREA 4C: 12.44 CM2
ECHO RA VOLUME: 32.9 ML
ECHO RV INTERNAL DIMENSION: 3.63 CM
ECHO TRICUSPID ANNULAR PEAK SYSTOLIC VELOCITY: 2.6 CM/S
ECHO TV REGURGITANT MAX VELOCITY: 218.28 CM/S
ECHO TV REGURGITANT PEAK GRADIENT: 19.1 MMHG
GLUCOSE BLD STRIP.AUTO-MCNC: 205 MG/DL (ref 70–110)
GLUCOSE BLD STRIP.AUTO-MCNC: 263 MG/DL (ref 70–110)
GLUCOSE BLD STRIP.AUTO-MCNC: 279 MG/DL (ref 70–110)
GLUCOSE BLD STRIP.AUTO-MCNC: 316 MG/DL (ref 70–110)
GLUCOSE SERPL-MCNC: 203 MG/DL (ref 74–99)
LVOT MG: 0.79 MMHG
LVOT MV: 0.43 CM/S
LVSV (MOD BI): 16.71 ML
LVSV (MOD SINGLE 4C): 21.99 ML
LVSV (MOD SINGLE): 12.62 ML
MV DEC SLOPE: 2.94
POTASSIUM SERPL-SCNC: 3.9 MMOL/L (ref 3.5–5.5)
SODIUM SERPL-SCNC: 132 MMOL/L (ref 136–145)

## 2020-05-02 PROCEDURE — 80048 BASIC METABOLIC PNL TOTAL CA: CPT

## 2020-05-02 PROCEDURE — 74011250637 HC RX REV CODE- 250/637: Performed by: FAMILY MEDICINE

## 2020-05-02 PROCEDURE — 74011636637 HC RX REV CODE- 636/637: Performed by: HOSPITALIST

## 2020-05-02 PROCEDURE — 96376 TX/PRO/DX INJ SAME DRUG ADON: CPT

## 2020-05-02 PROCEDURE — 74011250636 HC RX REV CODE- 250/636: Performed by: INTERNAL MEDICINE

## 2020-05-02 PROCEDURE — 74011250637 HC RX REV CODE- 250/637: Performed by: HOSPITALIST

## 2020-05-02 PROCEDURE — 74011250637 HC RX REV CODE- 250/637: Performed by: INTERNAL MEDICINE

## 2020-05-02 PROCEDURE — 74011250636 HC RX REV CODE- 250/636: Performed by: HOSPITALIST

## 2020-05-02 PROCEDURE — 99218 HC RM OBSERVATION: CPT

## 2020-05-02 PROCEDURE — 36415 COLL VENOUS BLD VENIPUNCTURE: CPT

## 2020-05-02 PROCEDURE — 96372 THER/PROPH/DIAG INJ SC/IM: CPT

## 2020-05-02 PROCEDURE — 82962 GLUCOSE BLOOD TEST: CPT

## 2020-05-02 PROCEDURE — 65270000029 HC RM PRIVATE

## 2020-05-02 RX ORDER — LOSARTAN POTASSIUM 25 MG/1
25 TABLET ORAL DAILY
Status: DISCONTINUED | OUTPATIENT
Start: 2020-05-02 | End: 2020-05-06 | Stop reason: HOSPADM

## 2020-05-02 RX ORDER — METOPROLOL SUCCINATE 25 MG/1
25 TABLET, EXTENDED RELEASE ORAL ONCE
Status: COMPLETED | OUTPATIENT
Start: 2020-05-02 | End: 2020-05-02

## 2020-05-02 RX ORDER — ATORVASTATIN CALCIUM 20 MG/1
40 TABLET, FILM COATED ORAL
Status: DISCONTINUED | OUTPATIENT
Start: 2020-05-02 | End: 2020-05-06 | Stop reason: HOSPADM

## 2020-05-02 RX ORDER — METOPROLOL SUCCINATE 100 MG/1
100 TABLET, EXTENDED RELEASE ORAL DAILY
Status: DISCONTINUED | OUTPATIENT
Start: 2020-05-03 | End: 2020-05-06 | Stop reason: HOSPADM

## 2020-05-02 RX ORDER — ISOSORBIDE DINITRATE 10 MG/1
5 TABLET ORAL 2 TIMES DAILY
Status: DISCONTINUED | OUTPATIENT
Start: 2020-05-02 | End: 2020-05-06 | Stop reason: HOSPADM

## 2020-05-02 RX ADMIN — FUROSEMIDE 40 MG: 10 INJECTION, SOLUTION INTRAMUSCULAR; INTRAVENOUS at 08:30

## 2020-05-02 RX ADMIN — INSULIN LISPRO 8 UNITS: 100 INJECTION, SOLUTION INTRAVENOUS; SUBCUTANEOUS at 12:00

## 2020-05-02 RX ADMIN — INSULIN LISPRO 6 UNITS: 100 INJECTION, SOLUTION INTRAVENOUS; SUBCUTANEOUS at 16:16

## 2020-05-02 RX ADMIN — INSULIN LISPRO 6 UNITS: 100 INJECTION, SOLUTION INTRAVENOUS; SUBCUTANEOUS at 22:21

## 2020-05-02 RX ADMIN — INSULIN GLARGINE 15 UNITS: 100 INJECTION, SOLUTION SUBCUTANEOUS at 08:32

## 2020-05-02 RX ADMIN — INSULIN LISPRO 5 UNITS: 100 INJECTION, SOLUTION INTRAVENOUS; SUBCUTANEOUS at 08:32

## 2020-05-02 RX ADMIN — HEPARIN SODIUM 5000 UNITS: 5000 INJECTION INTRAVENOUS; SUBCUTANEOUS at 06:30

## 2020-05-02 RX ADMIN — SPIRONOLACTONE 25 MG: 25 TABLET ORAL at 08:31

## 2020-05-02 RX ADMIN — INSULIN LISPRO 4 UNITS: 100 INJECTION, SOLUTION INTRAVENOUS; SUBCUTANEOUS at 06:31

## 2020-05-02 RX ADMIN — LOSARTAN POTASSIUM 25 MG: 25 TABLET ORAL at 08:31

## 2020-05-02 RX ADMIN — ISOSORBIDE DINITRATE 5 MG: 10 TABLET ORAL at 22:32

## 2020-05-02 RX ADMIN — ASPIRIN 81 MG 81 MG: 81 TABLET ORAL at 08:30

## 2020-05-02 RX ADMIN — HEPARIN SODIUM 5000 UNITS: 5000 INJECTION INTRAVENOUS; SUBCUTANEOUS at 16:15

## 2020-05-02 RX ADMIN — METOPROLOL SUCCINATE 25 MG: 25 TABLET, EXTENDED RELEASE ORAL at 22:31

## 2020-05-02 RX ADMIN — INSULIN LISPRO 5 UNITS: 100 INJECTION, SOLUTION INTRAVENOUS; SUBCUTANEOUS at 16:15

## 2020-05-02 RX ADMIN — INSULIN LISPRO 5 UNITS: 100 INJECTION, SOLUTION INTRAVENOUS; SUBCUTANEOUS at 12:00

## 2020-05-02 RX ADMIN — HEPARIN SODIUM 5000 UNITS: 5000 INJECTION INTRAVENOUS; SUBCUTANEOUS at 22:22

## 2020-05-02 RX ADMIN — ATORVASTATIN CALCIUM 40 MG: 20 TABLET, FILM COATED ORAL at 22:21

## 2020-05-02 RX ADMIN — FUROSEMIDE 40 MG: 10 INJECTION, SOLUTION INTRAMUSCULAR; INTRAVENOUS at 22:20

## 2020-05-02 NOTE — PROGRESS NOTES
Hospitalist Progress Note    Patient: Sheba Ramirez MRN: [id number]  CSN: [id number]    YOB: 1985  Age: 27 y.o. Sex: male    DOA: 4/30/2020 LOS:  LOS: 0 days            Assessment/Plan     Principal Problem:    Chest pain (4/30/2020)    Active Problems:    Essential hypertension, benign ()      Cardiomyopathy ([facility] [address] (12/1/2011)      Combined systolic and diastolic congestive heart failure ([facility] [address] (12/1/2011)      AICD (automatic cardioverter/defibrillator) present (9/19/2014)      Overview: Dual chamber Medtronic AICD for primary prevention 9/19/14      Hyperglycemia due to type 2 diabetes mellitus ([facility] [address] (4/30/2020)      Chest pain/angina : No chest pain overnight   No acs. Stress test on Monday  Echo done. Discussed the case with Dr. Marvel Dancer. AICD and cardiomyopathy , chf combined diastolic and systolic   Echo done with EF of 25 to 30%. Moderate to severe systolic dysfunction. Add statin    Chronic systolic/combined CHF : Continue lasix, arbs, bbb  And aspirin. Fluids restriction up to 1.2 L/day     DM type II , hyperglycemia: HbA1C was 12. 5. increase lantus to 15. premeal 5   ssi, diabetic diet , hypoglycemia protocol      HTN, accelerated: Continue home medication. HLP: add statin     Hyponatremia: Limit fluid intake, will monitor     Disposition :tbd. CC: chest pain, CHF exac, HTN, uncontrolled DM           Subjective:     Pt was seen and examined with the nurse in the morning round. No acute event overnight. No chest pain    Review of systems  General: No fevers or chills. Cardiovascular: No chest pain or pressure. No palpitations. Pulmonary: No cough, SOB  Gastrointestinal: No nausea, vomiting. Objective:      Visit Vitals  /77 (BP 1 Location: Right arm, BP Patient Position: At rest;Supine)   Pulse 72   Temp 97.9 °F (36.6 °C)   Resp 18   Ht 5' 3\" (1.6 m)   Wt 96.6 kg (213 lb)   SpO2 96%   BMI 37.73 kg/m²       Physical Exam:    Gen: NAD, non-toxic.   Heent:  MMM, NC, AT.  Cor: s1s2 RRR. No MRG. PMI mid 5th intercostal space. Resp:  CTA b/l. No w/r/r. Nml effort and diaphragmatic excursion. Abd:  Obese. NT ND.  BS positive. No rebound or guarding. No masses. Ext: No edema or cyanosis. Intake and Output:  Current Shift:  05/02 0701 - 05/02 1900  In: 240 [P.O.:240]  Out: -   Last three shifts:  04/30 1901 - 05/02 0700  In: 720 [P.O.:720]  Out: 2150 [Urine:2150]    Labs: Results:       Chemistry Recent Labs     05/02/20  0510 05/01/20  0450 04/30/20  1615   * 239* 344*   * 136 130*   K 3.9 3.6 3.6   CL 95* 99* 100   CO2 32 33* 28   BUN 14 17 17   CREA 0.93 0.88 0.81   CA 9.5 8.5 8.7   AGAP 5 4 2*   BUCR 15 19 21*   AP  --   --  79   TP  --   --  6.9   ALB  --   --  3.7   GLOB  --   --  3.2   AGRAT  --   --  1.2      CBC w/Diff Recent Labs     04/30/20  1535   WBC 7.5   RBC 5.40   HGB 15.4   HCT 43.9      GRANS 71   LYMPH 21   EOS 1      Cardiac Enzymes Recent Labs     05/01/20  1115 05/01/20  0450   CPK 65 59   CKND1 CALCULATION NOT PERFORMED WHEN RESULT IS BELOW LINEAR LIMIT CALCULATION NOT PERFORMED WHEN RESULT IS BELOW LINEAR LIMIT      Coagulation No results for input(s): PTP, INR, APTT, INREXT in the last 72 hours. Lipid Panel Lab Results   Component Value Date/Time    Cholesterol, total 218 (H) 05/01/2020 04:50 AM    HDL Cholesterol 27 (L) 05/01/2020 04:50 AM    LDL, calculated 142.2 (H) 05/01/2020 04:50 AM    VLDL, calculated 48.8 05/01/2020 04:50 AM    Triglyceride 244 (H) 05/01/2020 04:50 AM    CHOL/HDL Ratio 8.1 (H) 05/01/2020 04:50 AM      BNP No results for input(s): BNPP in the last 72 hours.    Liver Enzymes Recent Labs     04/30/20  1615   TP 6.9   ALB 3.7   AP 79   SGOT 25      Thyroid Studies Lab Results   Component Value Date/Time    TSH 1.54 11/17/2011 01:20 AM        Procedures/imaging: see electronic medical records for all procedures/Xrays and details which were not copied into this note but were reviewed prior to creation of Plan      Medications Reviewed  Zuhair Warren MD

## 2020-05-02 NOTE — PROGRESS NOTES
Cardiology Progress Note        Patient: Brenden Toribio        Sex: male          DOA: 4/30/2020  YOB: 1985      Age:  27 y.o.        LOS:  LOS: 0 days   Assessment/Plan     Principal Problem:    Chest pain (4/30/2020)    Active Problems:    Essential hypertension, benign ()      Cardiomyopathy ([address] (12/1/2011)      Combined systolic and diastolic congestive heart failure ([address] (12/1/2011)      AICD (automatic cardioverter/defibrillator) present (9/19/2014)      Overview: Dual chamber Medtronic AICD for primary prevention 9/19/14      Hyperglycemia due to type 2 diabetes mellitus ([address] (4/30/2020)      Morbid obesity ([address] (5/2/2020)      Hyperlipemia (5/2/2020)        Plan:  CP  SOB exertional   CMP  AICD        Increase metoprolol succinate from 50 to 100 mg for exertional tachycardia  Continue current meds  Stress test on Monday. Subjective:    cc:  CP  SOB  CMP  AICD        REVIEW OF SYSTEMS:     General: No fevers or chills. Cardiovascular: No chest pain or pressure. No palpitations. No ankle swelling  Pulmonary: +SOB, orthopnea, PND  Gastrointestinal: No nausea, vomiting or diarrhea      Objective:      Visit Vitals  /84 (BP 1 Location: Right arm, BP Patient Position: At rest;Supine)   Pulse 74   Temp 98 °F (36.7 °C)   Resp 18   Ht 5' 3\" (1.6 m)   Wt 96.6 kg (213 lb)   SpO2 97%   BMI 37.73 kg/m²     Body mass index is 37.73 kg/m². Physical Exam:  General Appearance: Comfortable, not using accessory muscles of respiration. NECK: No JVD, no thyroidomeglay. LUNGS: Clear bilaterally. HEART: S1+S2 audible,    ABD: Non-tender, BS Audible    EXT: No edema, and no cysnosis. VASCULAR EXAM: Pulses are intact. PSYCHIATRIC EXAM: Mood is appropriate.     Medication:  Current Facility-Administered Medications   Medication Dose Route Frequency    losartan (COZAAR) tablet 25 mg  25 mg Oral DAILY    isosorbide dinitrate (ISORDIL) tablet 5 mg  5 mg Oral BID    atorvastatin (LIPITOR) tablet 40 mg  40 mg Oral QHS    [START ON 5/3/2020] metoprolol succinate (TOPROL-XL) tablet 100 mg  100 mg Oral DAILY    metoprolol succinate (TOPROL-XL) XL tablet 25 mg  25 mg Oral ONCE    insulin glargine (LANTUS) injection 15 Units  15 Units SubCUTAneous DAILY    insulin lispro (HUMALOG) injection 5 Units  5 Units SubCUTAneous TIDAC    aspirin chewable tablet 81 mg  81 mg Oral DAILY    nitroglycerin (NITROSTAT) tablet 0.4 mg  0.4 mg SubLINGual Q5MIN PRN    acetaminophen (TYLENOL) tablet 650 mg  650 mg Oral Q4H PRN    morphine injection 1 mg  1 mg IntraVENous Q4H PRN    heparin (porcine) injection 5,000 Units  5,000 Units SubCUTAneous Q8H    spironolactone (ALDACTONE) tablet 25 mg  25 mg Oral DAILY    insulin lispro (HUMALOG) injection   SubCUTAneous AC&HS    glucose chewable tablet 16 g  4 Tab Oral PRN    glucagon (GLUCAGEN) injection 1 mg  1 mg IntraMUSCular PRN    furosemide (LASIX) injection 40 mg  40 mg IntraVENous Q12H               Lab/Data Reviewed:  Procedures/imaging: see electronic medical records for all procedures/Xrays   and details which were not copied into this note but were reviewed prior to creation of Plan       All lab results for the last 24 hours reviewed.      Recent Labs     04/30/20  1535   WBC 7.5   HGB 15.4   HCT 43.9        Recent Labs     05/02/20  0510 05/01/20  0450 04/30/20  1615   * 136 130*   K 3.9 3.6 3.6   CL 95* 99* 100   CO2 32 33* 28   * 239* 344*   BUN 14 17 17   CREA 0.93 0.88 0.81   CA 9.5 8.5 8.7       Signed By: Dana Matthews MD     May 2, 2020

## 2020-05-02 NOTE — CONSULTS
[facility] Consult Note      Patient: Liz Pelaez MRN: [id number]  SSN: [id number]    YOB: 1985  Age: 27 y.o. Sex: male    Date of Consultation: 04/30/2020  Referring Physician: Michael Villanueva MD  Reason for Consultation: Chest pain and shortness of breath     Chief complain: Chest pain    HPI: 27year old gentleman came to the emergency room if complaining of left-sided chest pain for one week. He is complaining of left-sided chest pain, more on exertion, pressure-like, no radiation and relieved by rest.  He is also complaining of shortness of breath on exertion. He is complaining of feeling fatigue and tired. He denies any orthopnea or PND. He denies any leg swelling. He denies any fever, cough, abdominal pain, nausea or vomiting. He denies any smoking or alcohol abuse. He is compliant with medication.     Past Medical History:   Diagnosis Date    Bradycardia     Congestive heart failure, unspecified     with dilated cardiomyopathy, EF 15%    Elevated liver enzymes 2/2/2015    Essential hypertension, benign     Poor compliance 7/26/2014     Past Surgical History:   Procedure Laterality Date    HX HEART CATHETERIZATION      HX PACEMAKER      pacermaker/defib placed     Current Facility-Administered Medications   Medication Dose Route Frequency    [START ON 5/2/2020] insulin glargine (LANTUS) injection 15 Units  15 Units SubCUTAneous DAILY    insulin lispro (HUMALOG) injection 5 Units  5 Units SubCUTAneous TIDAC    aspirin chewable tablet 81 mg  81 mg Oral DAILY    nitroglycerin (NITROSTAT) tablet 0.4 mg  0.4 mg SubLINGual Q5MIN PRN    acetaminophen (TYLENOL) tablet 650 mg  650 mg Oral Q4H PRN    morphine injection 1 mg  1 mg IntraVENous Q4H PRN    heparin (porcine) injection 5,000 Units  5,000 Units SubCUTAneous Q8H    metoprolol succinate (TOPROL-XL) XL tablet 50 mg  50 mg Oral DAILY    spironolactone (ALDACTONE) tablet 25 mg  25 mg Oral DAILY    insulin lispro (HUMALOG) injection SubCUTAneous AC&HS    glucose chewable tablet 16 g  4 Tab Oral PRN    glucagon (GLUCAGEN) injection 1 mg  1 mg IntraMUSCular PRN    furosemide (LASIX) injection 40 mg  40 mg IntraVENous Q12H    losartan (COZAAR) tablet 50 mg  50 mg Oral DAILY       Allergies and Intolerances:   No Known Allergies    Family History:   History reviewed. No pertinent family history. Social History:   He  reports that he has never smoked. He has never used smokeless tobacco.  He  reports current alcohol use. Review of Systems:     Gen: No fever, chills, malaise, weight loss/gain. Heent: No headache, rhinorrhea, epistaxis, ear pain, hearing loss, sinus pain, neck pain/stiffness, sore throat. Heart: Positive chest pain, shortness of breath, No palpitations, pnd, or orthopnea. Resp: No cough, hemoptysis, wheezing and dyspnea  GI: No nausea, vomiting, diarrhea, constipation, melena or hematochezia. : No urinary obstruction, dysuria or hematuria. Derm: No rash, new skin lesion or pruritis. Musc/skeletal: no bone or joint complains. Vasc: No edema, cyanosis or claudication. Endo: No heat/cold intolerance, no polyuria,polydipsia or polyphagia. Neuro: No unilateral weakness, numbness, tingling. No seizures. Heme: No easy bruising or bleeding. Physical:   Patient Vitals for the past 6 hrs:   Temp Pulse Resp BP SpO2   05/01/20 1953 98.3 °F (36.8 °C) 89 19 99/76 99 %         Exam:   General Appearance: Comfortable, not using accessory muscles of respiration. HEENT: FER. HEAD: Atraumatic  NECK: No JVD, no thyroidomeglay. CAROTIDS: No bruit  LUNGS: Clear bilaterally. HEART: S1+S2 audible, no murmur, no pericardial rub. ABD: Non-tender, BS Audible    EXT: No edema, and no cyanosis. VASCULAR EXAM: Pulses are intact. PSYCHIATRIC EXAM: Mood is appropriate. MUSCULOSKELETAL: Grossly no joint deformity.   NEUROLOGICAL: AAO times 3, Motor and sensory sytem intact     Review of Data:   LABS:   Lab Results Component Value Date/Time    WBC 7.5 04/30/2020 03:35 PM    HGB 15.4 04/30/2020 03:35 PM    HCT 43.9 04/30/2020 03:35 PM    PLATELET 805 95/53/1703 03:35 PM     Lab Results   Component Value Date/Time    Sodium 136 05/01/2020 04:50 AM    Potassium 3.6 05/01/2020 04:50 AM    Chloride 99 (L) 05/01/2020 04:50 AM    CO2 33 (H) 05/01/2020 04:50 AM    Glucose 239 (H) 05/01/2020 04:50 AM    BUN 17 05/01/2020 04:50 AM    Creatinine 0.88 05/01/2020 04:50 AM     Lab Results   Component Value Date/Time    Cholesterol, total 218 (H) 05/01/2020 04:50 AM    HDL Cholesterol 27 (L) 05/01/2020 04:50 AM    LDL, calculated 142.2 (H) 05/01/2020 04:50 AM    Triglyceride 244 (H) 05/01/2020 04:50 AM     No results found for: GPT  Lab Results   Component Value Date/Time    Hemoglobin A1c 12.5 (H) 04/30/2020 04:15 PM         Cardiology Procedures:   Results for orders placed or performed during the hospital encounter of 04/30/20   EKG, 12 LEAD, INITIAL   Result Value Ref Range    Ventricular Rate 81 BPM    Atrial Rate 81 BPM    P-R Interval 148 ms    QRS Duration 94 ms    Q-T Interval 368 ms    QTC Calculation (Bezet) 427 ms    Calculated P Axis 36 degrees    Calculated R Axis -18 degrees    Calculated T Axis -60 degrees    Diagnosis       Normal sinus rhythm  Septal infarct , age undetermined  T wave abnormality, consider lateral ischemia  Abnormal ECG  Confirmed by Belgica Bonilla MD, Brenden Villegas ([id number]) on 4/30/2020 11:01:44 PM     Results for orders placed or performed in visit on 09/30/14   PACEMAKER CHECK    Impression    See scanned report             Impression / Plan:    Patient Active Problem List   Diagnosis Code    Essential hypertension, benign I10    Cardiomyopathy ([address] I42.9    Combined systolic and diastolic congestive heart failure (HCC) I50.40    Poor compliance Z91.19    AICD (automatic cardioverter/defibrillator) present Z95.810    Elevated liver enzymes R74.8    Chest pain R07.9    Hyperglycemia due to type 2 diabetes mellitus (HCC) E11.65     Chronic systolic heart failure  Precordial chest pain  Newly diagnosed diabetes mellitus    26-year-old gentleman with known history of chronic systolic heart failure with ICD placement came with complaining of left-sided chest pain for last one half weeks. Denies any chest pain at rest.  EKG revealed sinus rhythm,septal infarct age undetermined, T-wave inversion in lateral leads. serial cardiac enzymes are negative. Echocardiogram revealed    Left Ventricle: Normal wall thickness. Dilated left ventricle. Moderate-to-severe systolic dysfunction. The estimated ejection fraction is 25 - 30%. There is moderate (grade 2) left ventricular diastolic dysfunction E/E' ratio is 10.69. Wall Scoring: The left ventricular wall motion is globally hypokinetic. Right Ventricle: Normal cavity size and global systolic function. Pacer/ICD present. Pulmonary Artery: Pulmonary arteries not well visualized. Pulmonary arterial systolic pressure (PASP) is 24 mmHg. Pulmonary hypertension not suggested by Doppler findings. Continue aspirin, metoprolol succinate, losartan and aldactone  Continue IV Lasix 40 mg twice a day. Add isosorbide dinitrate 5 mg by mouth twice a day. Strict input and output  Salt restriction up to 2 g per day and fluid restriction up to 1.2 L per day. Continue management as per hospital medicine. Advised about ischemia workup. Patient will need nuclear stress test prior to hospital discharge. Patient will be seen by  on weekend.          Signed By: Myah Del Cid MD     May 1, 2020

## 2020-05-02 NOTE — PROGRESS NOTES
DC Plan: Discharge home with MD follow up, family assistance. Will need PCP referral and follow up    Chart reviewed as CM on call. Pt admitted to tele by hospitalist.  Spoke with pt on phone, not entering patient rooms due to covid restrictions. CM role explained. Pt independent. Pt states his sister, Halle Billingsley is staying with him. States his mother will drive him home at discharge. Pt independent and denies using DME or [place] services. Pt denies having PCP, will need assistance with PCP referral CMS will assist. No other needs anticipated. Obs letter reviewed over phone, will have bedside nurse give hard copy. CM will cont to follow for transition of care needs and will be available via hospital  on weekends. Oral and Written notification given to patient and/or caregiver informing them that they are currently an Outpatient receiving care in our facility. Outpatient services include Observation Services under Critical access hospital requirements.          Care Management Interventions  PCP Verified by CM: No(cms will assist)  Transition of Care Consult (CM Consult): Discharge Planning  Current Support Network: Relative's Home  Confirm Follow Up Transport: Family  The Plan for Transition of Care is Related to the Following Treatment Goals : anticipate d/c home when medically cleared  The Patient and/or Patient Representative was Provided with a Choice of Provider and Agrees with the Discharge Plan?: Yes  Freedom of Choice List was Provided with Basic Dialogue that Supports the Patient's Individualized Plan of Care/Goals, Treatment Preferences and Shares the Quality Data Associated with the Providers?: Yes  Discharge Location  Discharge Placement: Home with family assistance

## 2020-05-02 NOTE — PROGRESS NOTES
Problem: Falls - Risk of  Goal: *Absence of Falls  Description: Document Starleen Freeze Fall Risk and appropriate interventions in the flowsheet.   Outcome: Progressing Towards Goal  Note: Fall Risk Interventions:            Medication Interventions: Evaluate medications/consider consulting pharmacy, Teach patient to arise slowly                   Problem: Patient Education: Go to Patient Education Activity  Goal: Patient/Family Education  Outcome: Progressing Towards Goal

## 2020-05-02 NOTE — PROGRESS NOTES
1915 Pt received from offgoing nurse without any signs or symptoms of distress. Pt vitals are stable and within normal limits. Pt bed in low position with wheels locked and call bell within reach. 2005 Assessment completed and documented in flow sheet. Pt denies any further needs at this time. Pt in NAD with bed in low position, wheels locked and call bell within reach. Purposeful rounding completed. Pt resting quietly. No further needs voiced at this time. 2147 Scheduled medications administered as ordered. 2211 Scheduled medications administered as ordered. 0119 Reassessment completed with no changes noted. Bed locked, in lowest position, with call light within reach. Purposeful rounding completed. Pt resting quietly. No further needs voiced at this time. 4319 Reassessment completed with no changes noted. Bed locked, in lowest position, with call light within reach. Purposeful rounding completed. Pt resting quietly. No further needs voiced at this time. 0630 Scheduled medications administered as ordered. 0715 Bedside and Verbal shift change report given to Gianna Flores (oncoming nurse) by Dyan Valle RN   (offgoing nurse). Report included the following information SBAR, Procedure Summary, Intake/Output, MAR and Recent Results.

## 2020-05-02 NOTE — PROGRESS NOTES
0700 Assumed patient care from off-going nurse Quan Love RN. Whiteboard updated, bed wheels locked, bed in lowest position, and call bell within reach. 0800 Assessment complete. Patient resting comfortably in bed. No complaint of pain or SOB at this time. Call bell within reach. Vitals are stable and NAD.    1200 Reassessment complete. Patient resting comfortably in bed. No complaint of pain or SOB at this time. Call bell within reach. 1600 Reassessment complete. Patient resting comfortably in bed. No complaint of pain or SOB at this time. Call bell within reach.

## 2020-05-02 NOTE — PROGRESS NOTES
Problem: Diabetes Self-Management  Goal: *Disease process and treatment process  Description: Define diabetes and identify own type of diabetes; list 3 options for treating diabetes. Outcome: Progressing Towards Goal  Goal: *Incorporating nutritional management into lifestyle  Description: Describe effect of type, amount and timing of food on blood glucose; list 3 methods for planning meals. Outcome: Progressing Towards Goal  Goal: *Incorporating physical activity into lifestyle  Description: State effect of exercise on blood glucose levels. Outcome: Progressing Towards Goal  Goal: *Developing strategies to promote health/change behavior  Description: Define the ABC's of diabetes; identify appropriate screenings, schedule and personal plan for screenings. Outcome: Progressing Towards Goal  Goal: *Using medications safely  Description: State effect of diabetes medications on diabetes; name diabetes medication taking, action and side effects. Outcome: Progressing Towards Goal  Goal: *Monitoring blood glucose, interpreting and using results  Description: Identify recommended blood glucose targets  and personal targets. Outcome: Progressing Towards Goal  Goal: *Prevention, detection, treatment of acute complications  Description: List symptoms of hyper- and hypoglycemia; describe how to treat low blood sugar and actions for lowering  high blood glucose level. Outcome: Progressing Towards Goal  Goal: *Prevention, detection and treatment of chronic complications  Description: Define the natural course of diabetes and describe the relationship of blood glucose levels to long term complications of diabetes.   Outcome: Progressing Towards Goal  Goal: *Developing strategies to address psychosocial issues  Description: Describe feelings about living with diabetes; identify support needed and support network  Outcome: Progressing Towards Goal  Goal: *Insulin pump training  Outcome: Progressing Towards Goal  Goal: *Sick day guidelines  Outcome: Progressing Towards Goal  Goal: *Patient Specific Goal (EDIT GOAL, INSERT TEXT)  Outcome: Progressing Towards Goal     Problem: Patient Education: Go to Patient Education Activity  Goal: Patient/Family Education  Outcome: Progressing Towards Goal     Problem: Falls - Risk of  Goal: *Absence of Falls  Description: Document Clydene Bone Fall Risk and appropriate interventions in the flowsheet.   Outcome: Progressing Towards Goal  Note: Fall Risk Interventions:            Medication Interventions: Patient to call before getting OOB, Teach patient to arise slowly                   Problem: Patient Education: Go to Patient Education Activity  Goal: Patient/Family Education  Outcome: Progressing Towards Goal

## 2020-05-03 LAB
ANION GAP SERPL CALC-SCNC: 8 MMOL/L (ref 3–18)
BUN SERPL-MCNC: 16 MG/DL (ref 7–18)
BUN/CREAT SERPL: 17 (ref 12–20)
CALCIUM SERPL-MCNC: 8.9 MG/DL (ref 8.5–10.1)
CHLORIDE SERPL-SCNC: 95 MMOL/L (ref 100–111)
CO2 SERPL-SCNC: 31 MMOL/L (ref 21–32)
CREAT SERPL-MCNC: 0.96 MG/DL (ref 0.6–1.3)
GLUCOSE BLD STRIP.AUTO-MCNC: 226 MG/DL (ref 70–110)
GLUCOSE BLD STRIP.AUTO-MCNC: 227 MG/DL (ref 70–110)
GLUCOSE BLD STRIP.AUTO-MCNC: 234 MG/DL (ref 70–110)
GLUCOSE BLD STRIP.AUTO-MCNC: 289 MG/DL (ref 70–110)
GLUCOSE SERPL-MCNC: 236 MG/DL (ref 74–99)
POTASSIUM SERPL-SCNC: 3.7 MMOL/L (ref 3.5–5.5)
SODIUM SERPL-SCNC: 134 MMOL/L (ref 136–145)

## 2020-05-03 PROCEDURE — 74011250636 HC RX REV CODE- 250/636: Performed by: HOSPITALIST

## 2020-05-03 PROCEDURE — 36415 COLL VENOUS BLD VENIPUNCTURE: CPT

## 2020-05-03 PROCEDURE — 96372 THER/PROPH/DIAG INJ SC/IM: CPT

## 2020-05-03 PROCEDURE — 99218 HC RM OBSERVATION: CPT

## 2020-05-03 PROCEDURE — 74011250637 HC RX REV CODE- 250/637: Performed by: INTERNAL MEDICINE

## 2020-05-03 PROCEDURE — 96376 TX/PRO/DX INJ SAME DRUG ADON: CPT

## 2020-05-03 PROCEDURE — 82962 GLUCOSE BLOOD TEST: CPT

## 2020-05-03 PROCEDURE — 80048 BASIC METABOLIC PNL TOTAL CA: CPT

## 2020-05-03 PROCEDURE — 74011636637 HC RX REV CODE- 636/637: Performed by: HOSPITALIST

## 2020-05-03 PROCEDURE — 65270000029 HC RM PRIVATE

## 2020-05-03 PROCEDURE — 74011250637 HC RX REV CODE- 250/637: Performed by: FAMILY MEDICINE

## 2020-05-03 PROCEDURE — 74011250637 HC RX REV CODE- 250/637: Performed by: HOSPITALIST

## 2020-05-03 PROCEDURE — 74011250636 HC RX REV CODE- 250/636: Performed by: INTERNAL MEDICINE

## 2020-05-03 RX ORDER — INSULIN GLARGINE 100 [IU]/ML
18 INJECTION, SOLUTION SUBCUTANEOUS DAILY
Status: DISCONTINUED | OUTPATIENT
Start: 2020-05-04 | End: 2020-05-04

## 2020-05-03 RX ADMIN — INSULIN LISPRO 4 UNITS: 100 INJECTION, SOLUTION INTRAVENOUS; SUBCUTANEOUS at 17:28

## 2020-05-03 RX ADMIN — FUROSEMIDE 40 MG: 10 INJECTION, SOLUTION INTRAMUSCULAR; INTRAVENOUS at 22:16

## 2020-05-03 RX ADMIN — INSULIN LISPRO 4 UNITS: 100 INJECTION, SOLUTION INTRAVENOUS; SUBCUTANEOUS at 22:28

## 2020-05-03 RX ADMIN — INSULIN LISPRO 5 UNITS: 100 INJECTION, SOLUTION INTRAVENOUS; SUBCUTANEOUS at 07:16

## 2020-05-03 RX ADMIN — HEPARIN SODIUM 5000 UNITS: 5000 INJECTION INTRAVENOUS; SUBCUTANEOUS at 22:17

## 2020-05-03 RX ADMIN — HEPARIN SODIUM 5000 UNITS: 5000 INJECTION INTRAVENOUS; SUBCUTANEOUS at 07:16

## 2020-05-03 RX ADMIN — SPIRONOLACTONE 25 MG: 25 TABLET ORAL at 08:47

## 2020-05-03 RX ADMIN — HEPARIN SODIUM 5000 UNITS: 5000 INJECTION INTRAVENOUS; SUBCUTANEOUS at 17:27

## 2020-05-03 RX ADMIN — ATORVASTATIN CALCIUM 40 MG: 20 TABLET, FILM COATED ORAL at 22:16

## 2020-05-03 RX ADMIN — ASPIRIN 81 MG 81 MG: 81 TABLET ORAL at 08:47

## 2020-05-03 RX ADMIN — METOPROLOL SUCCINATE 100 MG: 100 TABLET, EXTENDED RELEASE ORAL at 08:47

## 2020-05-03 RX ADMIN — INSULIN LISPRO 5 UNITS: 100 INJECTION, SOLUTION INTRAVENOUS; SUBCUTANEOUS at 17:27

## 2020-05-03 RX ADMIN — INSULIN GLARGINE 15 UNITS: 100 INJECTION, SOLUTION SUBCUTANEOUS at 08:46

## 2020-05-03 RX ADMIN — INSULIN LISPRO 4 UNITS: 100 INJECTION, SOLUTION INTRAVENOUS; SUBCUTANEOUS at 07:15

## 2020-05-03 RX ADMIN — ISOSORBIDE DINITRATE 5 MG: 10 TABLET ORAL at 08:47

## 2020-05-03 RX ADMIN — FUROSEMIDE 40 MG: 10 INJECTION, SOLUTION INTRAMUSCULAR; INTRAVENOUS at 08:47

## 2020-05-03 RX ADMIN — INSULIN LISPRO 5 UNITS: 100 INJECTION, SOLUTION INTRAVENOUS; SUBCUTANEOUS at 11:52

## 2020-05-03 RX ADMIN — INSULIN LISPRO 6 UNITS: 100 INJECTION, SOLUTION INTRAVENOUS; SUBCUTANEOUS at 11:53

## 2020-05-03 RX ADMIN — ISOSORBIDE DINITRATE 5 MG: 10 TABLET ORAL at 22:16

## 2020-05-03 NOTE — PROGRESS NOTES
Bedside and Verbal shift change report given to TRISH Moses RN (oncoming nurse) by LORENZO Shanks RN (offgoing nurse). Report included the following information SBAR, Kardex, Intake/Output, MAR and Recent Results.

## 2020-05-03 NOTE — PROGRESS NOTES
Problem: Falls - Risk of  Goal: *Absence of Falls  Description: Document Brody Syeda Fall Risk and appropriate interventions in the flowsheet.   Outcome: Progressing Towards Goal  Note: Fall Risk Interventions:            Medication Interventions: Evaluate medications/consider consulting pharmacy, Patient to call before getting OOB, Teach patient to arise slowly                   Problem: Patient Education: Go to Patient Education Activity  Goal: Patient/Family Education  Outcome: Progressing Towards Goal

## 2020-05-03 NOTE — PROGRESS NOTES
Cardiology Progress Note        Patient: Oxana Shelby        Sex: male          DOA: 4/30/2020  YOB: 1985      Age:  27 y.o.        LOS:  LOS: 0 days   Assessment/Plan     Principal Problem:    Chest pain (4/30/2020)    Active Problems:    Essential hypertension, benign ()      Cardiomyopathy ([facility] [address] (12/1/2011)      Combined systolic and diastolic congestive heart failure ([facility] [address] (12/1/2011)      AICD (automatic cardioverter/defibrillator) present (9/19/2014)      Overview: Dual chamber Medtronic AICD for primary prevention 9/19/14      Hyperglycemia due to type 2 diabetes mellitus ([facility] [address] (4/30/2020)      Morbid obesity ([facility] [address] (5/2/2020)      Hyperlipemia (5/2/2020)        Plan:  Sinus rhythm and heart rate is better control on metoprolol succinate 100 mg po daily  [facility]  CP  CMP      meds reviewed  Labs reviewed  Stress test tomorrow  Dr. Pino Landis will see patient tomorrow. Subjective:    cc:  [facility]  CP  CMP          REVIEW OF SYSTEMS:     General: No fevers or chills. Cardiovascular: No chest pain or pressure. No palpitations. No ankle swelling  Pulmonary: +SOB on exertion, orthopnea, PND  Gastrointestinal: No nausea, vomiting or diarrhea      Objective:      Visit Vitals  /85 (BP 1 Location: Right arm, BP Patient Position: At rest;Sitting)   Pulse 98   Temp 97.6 °F (36.4 °C)   Resp 17   Ht 5' 3\" (1.6 m)   Wt 96.6 kg (213 lb)   SpO2 98%   BMI 37.73 kg/m²     Body mass index is 37.73 kg/m². Physical Exam:  General Appearance: Comfortable, not using accessory muscles of respiration. NECK: No JVD, no thyroidomeglay. LUNGS: Clear bilaterally. HEART: S1+S2 audible,    ABD: Non-tender, BS Audible    EXT: No edema, and no cysnosis. VASCULAR EXAM: Pulses are intact. PSYCHIATRIC EXAM: Mood is appropriate.     Medication:  Current Facility-Administered Medications   Medication Dose Route Frequency    losartan (COZAAR) tablet 25 mg  25 mg Oral DAILY    isosorbide dinitrate (ISORDIL) tablet 5 mg  5 mg Oral BID    atorvastatin (LIPITOR) tablet 40 mg  40 mg Oral QHS    metoprolol succinate (TOPROL-XL) tablet 100 mg  100 mg Oral DAILY    insulin glargine (LANTUS) injection 15 Units  15 Units SubCUTAneous DAILY    insulin lispro (HUMALOG) injection 5 Units  5 Units SubCUTAneous TIDAC    aspirin chewable tablet 81 mg  81 mg Oral DAILY    nitroglycerin (NITROSTAT) tablet 0.4 mg  0.4 mg SubLINGual Q5MIN PRN    acetaminophen (TYLENOL) tablet 650 mg  650 mg Oral Q4H PRN    morphine injection 1 mg  1 mg IntraVENous Q4H PRN    heparin (porcine) injection 5,000 Units  5,000 Units SubCUTAneous Q8H    spironolactone (ALDACTONE) tablet 25 mg  25 mg Oral DAILY    insulin lispro (HUMALOG) injection   SubCUTAneous AC&HS    glucose chewable tablet 16 g  4 Tab Oral PRN    glucagon (GLUCAGEN) injection 1 mg  1 mg IntraMUSCular PRN    furosemide (LASIX) injection 40 mg  40 mg IntraVENous Q12H               Lab/Data Reviewed:  Procedures/imaging: see electronic medical records for all procedures/Xrays   and details which were not copied into this note but were reviewed prior to creation of Plan       All lab results for the last 24 hours reviewed.      Recent Labs     04/30/20  1535   WBC 7.5   HGB 15.4   HCT 43.9        Recent Labs     05/03/20  0410 05/02/20  0510 05/01/20  0450   * 132* 136   K 3.7 3.9 3.6   CL 95* 95* 99*   CO2 31 32 33*   * 203* 239*   BUN 16 14 17   CREA 0.96 0.93 0.88   CA 8.9 9.5 8.5       Signed By: Silvia Galicia MD     May 3, 2020

## 2020-05-03 NOTE — PROGRESS NOTES
0700 Assumed patient care from [address], RN. Whiteboard updated, bed wheels locked, bed in lowest position, and call bell within reach. 0800 Assessment complete. Patient resting comfortably in bed. No complaint of pain or SOB at this time. Call bell within reach. 1200 Reassessment complete. Patient resting comfortably in bed. No complaint of pain or SOB at this time. Call bell within reach. 1600 Reassessment complete. Patient resting comfortably in bed. No complaint of pain or SOB at this time. Call bell within reach.

## 2020-05-03 NOTE — PROGRESS NOTES
Hospitalist Progress Note    Patient: Vero Rios MRN: [id number]  CSN: [id number]    YOB: 1985  Age: 27 y.o. Sex: male    DOA: 4/30/2020 LOS:  LOS: 0 days            Assessment/Plan     Principal Problem:    Chest pain (4/30/2020)    Active Problems:    Essential hypertension, benign ()      Cardiomyopathy ([address] (12/1/2011)      Combined systolic and diastolic congestive heart failure ([address] (12/1/2011)      AICD (automatic cardioverter/defibrillator) present (9/19/2014)      Overview: Dual chamber Medtronic AICD for primary prevention 9/19/14      Hyperglycemia due to type 2 diabetes mellitus ([address] (4/30/2020)      Morbid obesity ([address] (5/2/2020)      Hyperlipemia (5/2/2020)      Chest pain/angina : No chest pain overnight   No acs. Stress test on Monday  Echo done. Discussed the case with Dr. Caryn Schwartz.     AICD and cardiomyopathy , chf combined diastolic and systolic   Echo done with EF of 25 to 30%. Moderate to severe systolic dysfunction. On statin     Chronic systolic/combined CHF : Continue lasix, arbs, bbb  And aspirin. Fluids restriction up to 1.2 L/day     DM type II , hyperglycemia: HbA1C was 12. 5. increase lantus to 18. premeal 5   ssi, diabetic diet , hypoglycemia protocol       HTN, accelerated: Continue home medication.     HLP: add statin     Hyponatremia: Limit fluid intake, will monitor     Disposition :tbd.     CC: chest pain, CHF exac, HTN, uncontrolled DM           Subjective:      Pt was seen and examined with the nurse in the morning round.      No acute event overnight. No chest pain. No constipation. Review of systems  General: No fevers or chills. Cardiovascular: No chest pain or pressure. No palpitations. Pulmonary: No cough, SOB  Gastrointestinal: No nausea, vomiting.      Objective:      Visit Vitals  /77 (BP 1 Location: Right arm, BP Patient Position: At rest;Supine)   Pulse 81   Temp 98.1 °F (36.7 °C)   Resp 17   Ht 5' 3\" (1.6 m)   Wt 96.6 kg (213 lb) SpO2 98%   BMI 37.73 kg/m²       Physical Exam:    Gen: NAD, obese. Heent:  MMM, NC, AT. Cor: s1s2 RRR. No MRG. PMI mid 5th intercostal space. Resp:  CTA b/l. No w/r/r. Nml effort and diaphragmatic excursion. Abd:  NT ND.  BS positive. No rebound or guarding. No masses. Ext: No edema or cyanosis. Intake and Output:  Current Shift:  05/03 0701 - 05/03 1900  In: -   Out: 850 [Urine:850]  Last three shifts:  05/01 1901 - 05/03 0700  In: 1760 [P.O.:1760]  Out: 2080 [Urine:3075]    Labs: Results:       Chemistry Recent Labs     05/03/20  0410 05/02/20  0510 05/01/20  0450 04/30/20  1615   * 203* 239* 344*   * 132* 136 130*   K 3.7 3.9 3.6 3.6   CL 95* 95* 99* 100   CO2 31 32 33* 28   BUN 16 14 17 17   CREA 0.96 0.93 0.88 0.81   CA 8.9 9.5 8.5 8.7   AGAP 8 5 4 2*   BUCR 17 15 19 21*   AP  --   --   --  79   TP  --   --   --  6.9   ALB  --   --   --  3.7   GLOB  --   --   --  3.2   AGRAT  --   --   --  1.2      CBC w/Diff Recent Labs     04/30/20  1535   WBC 7.5   RBC 5.40   HGB 15.4   HCT 43.9      GRANS 71   LYMPH 21   EOS 1      Cardiac Enzymes Recent Labs     05/01/20  1115 05/01/20  0450   CPK 65 59   CKND1 CALCULATION NOT PERFORMED WHEN RESULT IS BELOW LINEAR LIMIT CALCULATION NOT PERFORMED WHEN RESULT IS BELOW LINEAR LIMIT      Coagulation No results for input(s): PTP, INR, APTT, INREXT in the last 72 hours. Lipid Panel Lab Results   Component Value Date/Time    Cholesterol, total 218 (H) 05/01/2020 04:50 AM    HDL Cholesterol 27 (L) 05/01/2020 04:50 AM    LDL, calculated 142.2 (H) 05/01/2020 04:50 AM    VLDL, calculated 48.8 05/01/2020 04:50 AM    Triglyceride 244 (H) 05/01/2020 04:50 AM    CHOL/HDL Ratio 8.1 (H) 05/01/2020 04:50 AM      BNP No results for input(s): BNPP in the last 72 hours.    Liver Enzymes Recent Labs     04/30/20  1615   TP 6.9   ALB 3.7   AP 79   SGOT 25      Thyroid Studies Lab Results   Component Value Date/Time    TSH 1.54 11/17/2011 01:20 AM Procedures/imaging: see electronic medical records for all procedures/Xrays and details which were not copied into this note but were reviewed prior to creation of Plan      Medications Reviewed  Carmen Goodman MD

## 2020-05-04 ENCOUNTER — APPOINTMENT (OUTPATIENT)
Dept: NUCLEAR MEDICINE | Age: 35
DRG: 287 | End: 2020-05-04
Attending: INTERNAL MEDICINE
Payer: COMMERCIAL

## 2020-05-04 ENCOUNTER — APPOINTMENT (OUTPATIENT)
Dept: NON INVASIVE DIAGNOSTICS | Age: 35
DRG: 287 | End: 2020-05-04
Attending: INTERNAL MEDICINE
Payer: COMMERCIAL

## 2020-05-04 ENCOUNTER — HOME HEALTH ADMISSION (OUTPATIENT)
Dept: HOME HEALTH SERVICES | Facility: HOME HEALTH | Age: 35
End: 2020-05-04

## 2020-05-04 LAB
ALBUMIN SERPL-MCNC: 3.7 G/DL (ref 3.4–5)
ALBUMIN/GLOB SERPL: 1.1 {RATIO} (ref 0.8–1.7)
ALP SERPL-CCNC: 88 U/L (ref 45–117)
ALT SERPL-CCNC: 65 U/L (ref 16–61)
ANION GAP SERPL CALC-SCNC: 7 MMOL/L (ref 3–18)
AST SERPL-CCNC: 27 U/L (ref 10–38)
BILIRUB SERPL-MCNC: 0.8 MG/DL (ref 0.2–1)
BUN SERPL-MCNC: 15 MG/DL (ref 7–18)
BUN/CREAT SERPL: 16 (ref 12–20)
CALCIUM SERPL-MCNC: 8.9 MG/DL (ref 8.5–10.1)
CHLORIDE SERPL-SCNC: 95 MMOL/L (ref 100–111)
CO2 SERPL-SCNC: 31 MMOL/L (ref 21–32)
CREAT SERPL-MCNC: 0.94 MG/DL (ref 0.6–1.3)
GLOBULIN SER CALC-MCNC: 3.4 G/DL (ref 2–4)
GLUCOSE BLD STRIP.AUTO-MCNC: 199 MG/DL (ref 70–110)
GLUCOSE BLD STRIP.AUTO-MCNC: 249 MG/DL (ref 70–110)
GLUCOSE BLD STRIP.AUTO-MCNC: 255 MG/DL (ref 70–110)
GLUCOSE BLD STRIP.AUTO-MCNC: 272 MG/DL (ref 70–110)
GLUCOSE SERPL-MCNC: 254 MG/DL (ref 74–99)
MAGNESIUM SERPL-MCNC: 2 MG/DL (ref 1.6–2.6)
POTASSIUM SERPL-SCNC: 3.8 MMOL/L (ref 3.5–5.5)
PROT SERPL-MCNC: 7.1 G/DL (ref 6.4–8.2)
SODIUM SERPL-SCNC: 133 MMOL/L (ref 136–145)

## 2020-05-04 PROCEDURE — 74011250636 HC RX REV CODE- 250/636: Performed by: HOSPITALIST

## 2020-05-04 PROCEDURE — 99218 HC RM OBSERVATION: CPT

## 2020-05-04 PROCEDURE — 74011250637 HC RX REV CODE- 250/637: Performed by: FAMILY MEDICINE

## 2020-05-04 PROCEDURE — 74011250636 HC RX REV CODE- 250/636: Performed by: INTERNAL MEDICINE

## 2020-05-04 PROCEDURE — 74011250637 HC RX REV CODE- 250/637: Performed by: HOSPITALIST

## 2020-05-04 PROCEDURE — 82962 GLUCOSE BLOOD TEST: CPT

## 2020-05-04 PROCEDURE — 74011636637 HC RX REV CODE- 636/637: Performed by: FAMILY MEDICINE

## 2020-05-04 PROCEDURE — 65270000029 HC RM PRIVATE

## 2020-05-04 PROCEDURE — A9500 TC99M SESTAMIBI: HCPCS

## 2020-05-04 PROCEDURE — 74011250637 HC RX REV CODE- 250/637: Performed by: INTERNAL MEDICINE

## 2020-05-04 PROCEDURE — 74011636637 HC RX REV CODE- 636/637: Performed by: HOSPITALIST

## 2020-05-04 PROCEDURE — 36415 COLL VENOUS BLD VENIPUNCTURE: CPT

## 2020-05-04 PROCEDURE — 83735 ASSAY OF MAGNESIUM: CPT

## 2020-05-04 PROCEDURE — 93017 CV STRESS TEST TRACING ONLY: CPT

## 2020-05-04 PROCEDURE — 80053 COMPREHEN METABOLIC PANEL: CPT

## 2020-05-04 RX ORDER — INSULIN GLARGINE 100 [IU]/ML
20 INJECTION, SOLUTION SUBCUTANEOUS DAILY
Qty: 3 ML | Refills: 0 | Status: SHIPPED | OUTPATIENT
Start: 2020-05-04 | End: 2020-05-04 | Stop reason: SDUPTHER

## 2020-05-04 RX ORDER — INSULIN LISPRO 100 [IU]/ML
7 INJECTION, SOLUTION INTRAVENOUS; SUBCUTANEOUS
Status: DISCONTINUED | OUTPATIENT
Start: 2020-05-04 | End: 2020-05-06 | Stop reason: HOSPADM

## 2020-05-04 RX ORDER — SYRING-NEEDL,DISP,INSUL,0.3 ML 31 G X1/4"
1 SYRINGE, EMPTY DISPOSABLE MISCELLANEOUS DAILY
Qty: 100 SYRINGE | Refills: 0 | Status: SHIPPED | OUTPATIENT
Start: 2020-05-04 | End: 2020-05-04 | Stop reason: SDUPTHER

## 2020-05-04 RX ORDER — SYRING-NEEDL,DISP,INSUL,0.3 ML 31 G X1/4"
1 SYRINGE, EMPTY DISPOSABLE MISCELLANEOUS DAILY
Qty: 100 SYRINGE | Refills: 0 | Status: SHIPPED | OUTPATIENT
Start: 2020-05-04 | End: 2020-05-05 | Stop reason: SDUPTHER

## 2020-05-04 RX ORDER — INSULIN GLARGINE 100 [IU]/ML
20 INJECTION, SOLUTION SUBCUTANEOUS DAILY
Status: DISCONTINUED | OUTPATIENT
Start: 2020-05-05 | End: 2020-05-06 | Stop reason: HOSPADM

## 2020-05-04 RX ORDER — INSULIN GLARGINE 100 [IU]/ML
20 INJECTION, SOLUTION SUBCUTANEOUS DAILY
Qty: 3 ML | Refills: 0 | Status: SHIPPED | OUTPATIENT
Start: 2020-05-04 | End: 2020-05-05 | Stop reason: SDUPTHER

## 2020-05-04 RX ADMIN — INSULIN GLARGINE 18 UNITS: 100 INJECTION, SOLUTION SUBCUTANEOUS at 12:34

## 2020-05-04 RX ADMIN — INSULIN LISPRO 2 UNITS: 100 INJECTION, SOLUTION INTRAVENOUS; SUBCUTANEOUS at 22:00

## 2020-05-04 RX ADMIN — FUROSEMIDE 40 MG: 10 INJECTION, SOLUTION INTRAMUSCULAR; INTRAVENOUS at 12:33

## 2020-05-04 RX ADMIN — INSULIN LISPRO 6 UNITS: 100 INJECTION, SOLUTION INTRAVENOUS; SUBCUTANEOUS at 12:32

## 2020-05-04 RX ADMIN — LOSARTAN POTASSIUM 25 MG: 25 TABLET ORAL at 08:55

## 2020-05-04 RX ADMIN — SPIRONOLACTONE 25 MG: 25 TABLET ORAL at 08:55

## 2020-05-04 RX ADMIN — REGADENOSON 0.4 MG: 0.08 INJECTION, SOLUTION INTRAVENOUS at 11:25

## 2020-05-04 RX ADMIN — ISOSORBIDE DINITRATE 5 MG: 10 TABLET ORAL at 12:33

## 2020-05-04 RX ADMIN — INSULIN LISPRO 5 UNITS: 100 INJECTION, SOLUTION INTRAVENOUS; SUBCUTANEOUS at 11:30

## 2020-05-04 RX ADMIN — INSULIN LISPRO 7 UNITS: 100 INJECTION, SOLUTION INTRAVENOUS; SUBCUTANEOUS at 17:01

## 2020-05-04 RX ADMIN — ATORVASTATIN CALCIUM 40 MG: 20 TABLET, FILM COATED ORAL at 21:41

## 2020-05-04 RX ADMIN — INSULIN LISPRO 6 UNITS: 100 INJECTION, SOLUTION INTRAVENOUS; SUBCUTANEOUS at 17:01

## 2020-05-04 RX ADMIN — HEPARIN SODIUM 5000 UNITS: 5000 INJECTION INTRAVENOUS; SUBCUTANEOUS at 15:14

## 2020-05-04 RX ADMIN — ASPIRIN 81 MG 81 MG: 81 TABLET ORAL at 08:55

## 2020-05-04 RX ADMIN — HEPARIN SODIUM 5000 UNITS: 5000 INJECTION INTRAVENOUS; SUBCUTANEOUS at 22:00

## 2020-05-04 RX ADMIN — METOPROLOL SUCCINATE 100 MG: 100 TABLET, EXTENDED RELEASE ORAL at 12:33

## 2020-05-04 RX ADMIN — ISOSORBIDE DINITRATE 5 MG: 10 TABLET ORAL at 21:41

## 2020-05-04 NOTE — PHYSICIAN ADVISORY
Letter of Status Determination: Current Status OBSERVATION is Appropriate Pt Name:  Ronald Carrasco MR#  [id number] [facility]#   [id number] Room and Hospital  332/01  @ [address] [facility]  
Hospitalization date  4/30/2020  3:20 PM  
Current Attending Physician  Angelito Ontiveros MD  
Principal diagnosis  Chest pain Dipti Blackmon is a 27 y.o. male with PMHX of hypertension, cardiomyopathy, AICD came to ER due to on and off chest pain for 2 weeks. He reported he had chest pain on exertion, resolved per resting. Associated with shortness of breath. Chest pain located middle of the chest.  No radiation, it is dull pain. No leg swelling. He follow-up with Dr. Wendy Jerry cardiologist for this heart problem. He called cardiologist in the recommended to come to the hospital or office if symptoms become worse. Today, his chest pain became worsening while he was walking a dog. He denies any fever chills, no COVID 19 contact. His first troponin was normal, proBNP normal.  Chest  x-ray was clear. He was found glucose over 300. He denies any history of diabetes. He has no primary care physician Patient appears to be in volume overload, started on Lasix IV, isosorbide dinitrate, chest pain resolved, stress test this morning, metoprolol increased for exertional tachycardia, mildly tachycardic this morning, afebrile [place] MCG criteria Does  NOT apply STATUS DETERMINATION  On the basis of clinical data, available documentaion, we believe that the current status of this patient as OBSERVATION is Appropriate The final decision of the patient's hospitalization status depends on the attending physician's judgment Additional comments Insurance  Payor: BLUE CROSS / Plan: [address] [place] [address] / Product Type: PPO / Insurance Information [place] OUT OF STATE Phone:   
 Subscriber: Gely Villa Subscriber#: [id number]  Group#: [id number] Precert#:   
  
 Yamil Aggarwal MD 
Cell: [phone] Physician Advisor

## 2020-05-04 NOTE — PROGRESS NOTES
Bedside and Verbal shift change report given to SHIRA Morse RN (oncoming nurse) by LORENZO Peace RN (offgoing nurse). Report included the following information SBAR, Kardex, Intake/Output, MAR and Recent Results.

## 2020-05-04 NOTE — PROGRESS NOTES
0700: Assumed are of pt from [facility]: Pt off [facility] to [facility]. 1040: Rounded with  regarding pt. Pt will need diabetes education before discharge. 1300: This RN gave pt glucometer and diabetes education and educated pt on given self insulin. Pt gave himself insulin in the abdomen and demonstrated how to do it.  1700: pt took his own blood sugar with his new glucometer and did this well. Pt was educated o when to talk blood sugars. And also administered his insulin himself.

## 2020-05-04 NOTE — PROGRESS NOTES
Hospitalist Progress Note-critical care note     Patient: Oxana Shelby MRN: [id number]  CSN: [id number]    YOB: 1985  Age: 27 y.o. Sex: male    DOA: 4/30/2020 LOS:  LOS: 0 days            Chief complaint: chest pain , dm type II, HTN, cardiomyopathy.  chf     Assessment/Plan         Hospital Problems  Date Reviewed: 2/2/2015          Codes Class Noted POA    Morbid obesity ([facility] ICD-10-CM: E66.01  ICD-9-CM: 278.01  5/2/2020 Unknown        Hyperlipemia ICD-10-CM: E78.5  ICD-9-CM: 272.4  5/2/2020 Unknown        * (Principal) Chest pain ICD-10-CM: R07.9  ICD-9-CM: 786.50  4/30/2020 Unknown        Hyperglycemia due to type 2 diabetes mellitus ([facility] ICD-10-CM: E11.65  ICD-9-CM: 250.00  4/30/2020         AICD (automatic cardioverter/defibrillator) present ICD-10-CM: Z95.810  ICD-9-CM: V45.02  9/19/2014 Yes    Overview Signed 9/19/2014  9:50 AM by Aranza Eric MD     Dual chamber [employer] AICD for primary prevention 9/19/14             Essential hypertension, benign ICD-10-CM: I10  ICD-9-CM: 401.1  Unknown Yes        Cardiomyopathy ([facility] ICD-10-CM: I42.9  ICD-9-CM: 425.4  12/1/2011 Yes        Combined systolic and diastolic congestive heart failure ([facility] ICD-10-CM: I50.40  ICD-9-CM: 428.40  12/1/2011 Yes              Chest pain-angina   No chest pain overnight   No acs   Echo done ,  Case discussed with Dr. Pino Landis, stress test abnormal -will have cath tomorrow      AICD and cardiomyopathy , chf combined diastolic and systolic   Echo ef 99-64 %   chf stable   Continue lasix, arbs, bbb  And aspirin            DM type II , hyperglycemia   -increase lantus to 20  premeal 7  ssi, diabetic diet , hypoglycemia protocol    Need to go home with insulin and metformin -send meds to our pharm          HTN, accelerated  Continue home medication.     Hyponatremia -pseudo  Resolved     Hld: continue statin     Morbid obesity   Continue lifestyle modification     Subjective: no chest pain,   rn : self injected insulin Diabetic education gave to pt and including symptoms hyperglycemia and hypoglycemia, need pcp to f/u with the glucose. need eye exam as out-pt . He indicated a verbal understanding. All questions have been answered. 35 total min's spent on patient care including >50% on counseling/coordinating care. Discussed the above assessments. also discussed labs, medications and hospital course      Disposition :1-2 depending on cath results   Review of systems:    General: No fevers or chills. Cardiovascular: No chest pain or pressure. No palpitations. Pulmonary: No shortness of breath. Gastrointestinal: No nausea, vomiting. Vital signs/Intake and Output:  Visit Vitals  /67 (BP 1 Location: Right arm, BP Patient Position: At rest)   Pulse 98   Temp 98.5 °F (36.9 °C)   Resp 16   Ht 5' 3\" (1.6 m)   Wt 107 kg (235 lb 14.3 oz)   SpO2 98%   BMI 41.79 kg/m²     Current Shift:  05/04 0701 - 05/04 1900  In: 180 [P.O.:180]  Out: 350 [Urine:350]  Last three shifts:  05/02 1901 - 05/04 0700  In: 2040 [P.O.:2040]  Out: 5286 [Urine:3325]    Physical Exam:  General: WD, WN. Alert, cooperative, no acute distress    HEENT: NC, Atraumatic. PERRLA, anicteric sclerae. Lungs: CTA Bilaterally. No Wheezing/Rhonchi/Rales. Heart:  Regular  rhythm,  No murmur, No Rubs, No Gallops  Abdomen: Soft, Non distended, Non tender. +Bowel sounds,   Extremities: No c/c/e  Psych:   Not anxious or agitated. Neurologic:  No acute neurological deficit.              Labs: Results:       Chemistry Recent Labs     05/04/20  0320 05/03/20  0410 05/02/20  0510   * 236* 203*   * 134* 132*   K 3.8 3.7 3.9   CL 95* 95* 95*   CO2 31 31 32   BUN 15 16 14   CREA 0.94 0.96 0.93   CA 8.9 8.9 9.5   AGAP 7 8 5   BUCR 16 17 15   AP 88  --   --    TP 7.1  --   --    ALB 3.7  --   --    GLOB 3.4  --   --    AGRAT 1.1  --   --       CBC w/Diff No results for input(s): WBC, RBC, HGB, HCT, PLT, GRANS, LYMPH, EOS, HGBEXT, HCTEXT, PLTEXT, HGBEXT, HCTEXT, PLTEXT in the last 72 hours. Cardiac Enzymes No results for input(s): CPK, CKND1, CARLOS ENRIQUE in the last 72 hours. No lab exists for component: CKRMB, TROIP   Coagulation No results for input(s): PTP, INR, APTT, INREXT, INREXT in the last 72 hours. Lipid Panel Lab Results   Component Value Date/Time    Cholesterol, total 218 (H) 05/01/2020 04:50 AM    HDL Cholesterol 27 (L) 05/01/2020 04:50 AM    LDL, calculated 142.2 (H) 05/01/2020 04:50 AM    VLDL, calculated 48.8 05/01/2020 04:50 AM    Triglyceride 244 (H) 05/01/2020 04:50 AM    CHOL/HDL Ratio 8.1 (H) 05/01/2020 04:50 AM      BNP No results for input(s): BNPP in the last 72 hours. Liver Enzymes Recent Labs     05/04/20  0320   TP 7.1   ALB 3.7   AP 88   SGOT 27      Thyroid Studies Lab Results   Component Value Date/Time    TSH 1.54 11/17/2011 01:20 AM        Procedures/imaging: see electronic medical records for all procedures/Xrays and details which were not copied into this note but were reviewed prior to creation of Plan    Xr Chest Port    Result Date: 4/30/2020  EXAM: XR CHEST PORT CLINICAL INDICATION/HISTORY: SOB -Additional: None COMPARISON: 9/20/14 TECHNIQUE: Portable frontal view of the chest _______________ FINDINGS: SUPPORT DEVICES: None. HEART AND MEDIASTINUM: Left chest wall AICD/pacemaker. Cardiomediastinal silhouette within normal limits. LUNGS AND PLEURAL SPACES: No dense consolidation, large effusion or pneumothorax. _______________     IMPRESSION: No acute cardiopulmonary abnormality.       Erick Yu MD

## 2020-05-04 NOTE — PROGRESS NOTES
Problem: Falls - Risk of  Goal: *Absence of Falls  Description: Document Leafy Burnett Fall Risk and appropriate interventions in the flowsheet.   Outcome: Progressing Towards Goal  Note: Fall Risk Interventions:            Medication Interventions: Evaluate medications/consider consulting pharmacy, Patient to call before getting OOB, Teach patient to arise slowly                   Problem: Patient Education: Go to Patient Education Activity  Goal: Patient/Family Education  Outcome: Progressing Towards Goal

## 2020-05-04 NOTE — ROUTINE PROCESS
Bedside shift change report given to Josef Rothman RN (oncoming nurse) by Harini Hussein RN (offgoing nurse). Report included the following information SBAR, Kardex, Intake/Output and MAR.

## 2020-05-04 NOTE — PROGRESS NOTES
1915  Assumed care of pt   1925  Assessment complete   0015  All fluids and food removed from bedside  Reassessment complete   0400  Reassessment complete   0630  Heparin and Insulin held due to cardiac cath this morning     Bedside and Verbal shift change report given to Bing MARTINEZ RN (oncoming nurse) by Vick Haq (offgoing nurse). Report included the following information SBAR, Kardex, ED Summary, Intake/Output, MAR, Recent Results, Med Rec Status and Cardiac Rhythm NSR.

## 2020-05-04 NOTE — PROGRESS NOTES
Transition of care:    Did not enter room due to covid restrictions   telephone call to room no answer. 1400 telephone call with patient. States they told him he is a diabetic and would have to learn how to give self insulin and care for self as a new onset diabetes. [address] [place]  Per foc  Transition of Care Plan:     The Plan for Transition of Care is related to the following treatment goals: diabetic education insulin administrations and glucose monitoring    The Patient  was provided with a choice of provider and agrees  with the discharge plan. Yes [x] No []    A Freedom of choice list was provided with basic dialogue that supports the patient's individualized plan of care/goals and shares the quality data associated with the providers. Yes [x] No []  Patient lives with his sister and she is available to learn per patient. Patient reports his mother will drive him home  He reports he is IADL\"S. Does not use any other DME. Reports that he has glucometer since he was in hospital  He has [employer] for insurance. He does not have pcp. Cms will assist. Cm will place referral for foc. Care Management Interventions  PCP Verified by CM:  Yes  Transition of Care Consult (CM Consult): [address]: Yes  Current Support Network: Relative's Home  Confirm Follow Up Transport: Family  The Plan for Transition of Care is Related to the Following Treatment Goals : patient   The Patient and/or Patient Representative was Provided with a Choice of Provider and Agrees with the Discharge Plan?: Yes  Freedom of Choice List was Provided with Basic Dialogue that Supports the Patient's Individualized Plan of Care/Goals, Treatment Preferences and Shares the Quality Data Associated with the Providers?: Yes  Discharge Location  Discharge Placement: Home with home health

## 2020-05-05 LAB
ALBUMIN SERPL-MCNC: 3.7 G/DL (ref 3.4–5)
ALBUMIN/GLOB SERPL: 1.1 {RATIO} (ref 0.8–1.7)
ALP SERPL-CCNC: 78 U/L (ref 45–117)
ALT SERPL-CCNC: 63 U/L (ref 16–61)
ANION GAP SERPL CALC-SCNC: 5 MMOL/L (ref 3–18)
AST SERPL-CCNC: 30 U/L (ref 10–38)
BASOPHILS # BLD: 0.1 K/UL (ref 0–0.1)
BASOPHILS NFR BLD: 1 % (ref 0–2)
BILIRUB SERPL-MCNC: 0.8 MG/DL (ref 0.2–1)
BUN SERPL-MCNC: 14 MG/DL (ref 7–18)
BUN/CREAT SERPL: 15 (ref 12–20)
CALCIUM SERPL-MCNC: 9 MG/DL (ref 8.5–10.1)
CHLORIDE SERPL-SCNC: 98 MMOL/L (ref 100–111)
CO2 SERPL-SCNC: 30 MMOL/L (ref 21–32)
CREAT SERPL-MCNC: 0.91 MG/DL (ref 0.6–1.3)
DIFFERENTIAL METHOD BLD: NORMAL
EOSINOPHIL # BLD: 0.2 K/UL (ref 0–0.4)
EOSINOPHIL NFR BLD: 2 % (ref 0–5)
ERYTHROCYTE [DISTWIDTH] IN BLOOD BY AUTOMATED COUNT: 13.5 % (ref 11.6–14.5)
GLOBULIN SER CALC-MCNC: 3.3 G/DL (ref 2–4)
GLUCOSE BLD STRIP.AUTO-MCNC: 154 MG/DL (ref 70–110)
GLUCOSE BLD STRIP.AUTO-MCNC: 183 MG/DL (ref 70–110)
GLUCOSE BLD STRIP.AUTO-MCNC: 244 MG/DL (ref 70–110)
GLUCOSE SERPL-MCNC: 170 MG/DL (ref 74–99)
HCT VFR BLD AUTO: 43.5 % (ref 36–48)
HGB BLD-MCNC: 15 G/DL (ref 13–16)
INR PPP: 1.1 (ref 0.8–1.2)
LYMPHOCYTES # BLD: 2.6 K/UL (ref 0.9–3.6)
LYMPHOCYTES NFR BLD: 29 % (ref 21–52)
MCH RBC QN AUTO: 28.4 PG (ref 24–34)
MCHC RBC AUTO-ENTMCNC: 34.5 G/DL (ref 31–37)
MCV RBC AUTO: 82.2 FL (ref 74–97)
MONOCYTES # BLD: 0.6 K/UL (ref 0.05–1.2)
MONOCYTES NFR BLD: 7 % (ref 3–10)
NEUTS SEG # BLD: 5.4 K/UL (ref 1.8–8)
NEUTS SEG NFR BLD: 61 % (ref 40–73)
PLATELET # BLD AUTO: 240 K/UL (ref 135–420)
PMV BLD AUTO: 11.7 FL (ref 9.2–11.8)
POTASSIUM SERPL-SCNC: 4.3 MMOL/L (ref 3.5–5.5)
PROT SERPL-MCNC: 7 G/DL (ref 6.4–8.2)
PROTHROMBIN TIME: 13.8 SEC (ref 11.5–15.2)
RBC # BLD AUTO: 5.29 M/UL (ref 4.7–5.5)
SODIUM SERPL-SCNC: 133 MMOL/L (ref 136–145)
WBC # BLD AUTO: 9 K/UL (ref 4.6–13.2)

## 2020-05-05 PROCEDURE — 80053 COMPREHEN METABOLIC PANEL: CPT

## 2020-05-05 PROCEDURE — 74011636637 HC RX REV CODE- 636/637: Performed by: HOSPITALIST

## 2020-05-05 PROCEDURE — 77030013797 HC KT TRNSDUC PRSSR EDWD -A: Performed by: INTERNAL MEDICINE

## 2020-05-05 PROCEDURE — 82962 GLUCOSE BLOOD TEST: CPT

## 2020-05-05 PROCEDURE — 65270000029 HC RM PRIVATE

## 2020-05-05 PROCEDURE — 74011250636 HC RX REV CODE- 250/636: Performed by: INTERNAL MEDICINE

## 2020-05-05 PROCEDURE — 85025 COMPLETE CBC W/AUTO DIFF WBC: CPT

## 2020-05-05 PROCEDURE — 74011250637 HC RX REV CODE- 250/637: Performed by: FAMILY MEDICINE

## 2020-05-05 PROCEDURE — 77030016699 HC CATH ANGI DX INFN1 CARD -A: Performed by: INTERNAL MEDICINE

## 2020-05-05 PROCEDURE — 93460 R&L HRT ART/VENTRICLE ANGIO: CPT | Performed by: INTERNAL MEDICINE

## 2020-05-05 PROCEDURE — 77030029997 HC DEV COM RDL R BND TELE -B: Performed by: INTERNAL MEDICINE

## 2020-05-05 PROCEDURE — 74011250637 HC RX REV CODE- 250/637: Performed by: INTERNAL MEDICINE

## 2020-05-05 PROCEDURE — 74011250637 HC RX REV CODE- 250/637: Performed by: HOSPITALIST

## 2020-05-05 PROCEDURE — 85610 PROTHROMBIN TIME: CPT

## 2020-05-05 PROCEDURE — 74011250636 HC RX REV CODE- 250/636: Performed by: HOSPITALIST

## 2020-05-05 PROCEDURE — 74011636320 HC RX REV CODE- 636/320: Performed by: INTERNAL MEDICINE

## 2020-05-05 PROCEDURE — 99153 MOD SED SAME PHYS/QHP EA: CPT | Performed by: INTERNAL MEDICINE

## 2020-05-05 PROCEDURE — C1769 GUIDE WIRE: HCPCS | Performed by: INTERNAL MEDICINE

## 2020-05-05 PROCEDURE — 77030004522 HC CATH ANGI DX EXPO BSC -A: Performed by: INTERNAL MEDICINE

## 2020-05-05 PROCEDURE — 74011000250 HC RX REV CODE- 250: Performed by: INTERNAL MEDICINE

## 2020-05-05 PROCEDURE — 36415 COLL VENOUS BLD VENIPUNCTURE: CPT

## 2020-05-05 PROCEDURE — B2111ZZ FLUOROSCOPY OF MULTIPLE CORONARY ARTERIES USING LOW OSMOLAR CONTRAST: ICD-10-PCS | Performed by: INTERNAL MEDICINE

## 2020-05-05 PROCEDURE — 77030004521 HC CATH ANGI DX COOK -B: Performed by: INTERNAL MEDICINE

## 2020-05-05 PROCEDURE — C1887 CATHETER, GUIDING: HCPCS | Performed by: INTERNAL MEDICINE

## 2020-05-05 PROCEDURE — C1894 INTRO/SHEATH, NON-LASER: HCPCS | Performed by: INTERNAL MEDICINE

## 2020-05-05 PROCEDURE — 99152 MOD SED SAME PHYS/QHP 5/>YRS: CPT | Performed by: INTERNAL MEDICINE

## 2020-05-05 PROCEDURE — C1751 CATH, INF, PER/CENT/MIDLINE: HCPCS | Performed by: INTERNAL MEDICINE

## 2020-05-05 PROCEDURE — 77030008543 HC TBNG MON PRSS MRTM -A: Performed by: INTERNAL MEDICINE

## 2020-05-05 PROCEDURE — 4A023N8 MEASUREMENT OF CARDIAC SAMPLING AND PRESSURE, BILATERAL, PERCUTANEOUS APPROACH: ICD-10-PCS | Performed by: INTERNAL MEDICINE

## 2020-05-05 RX ORDER — METFORMIN HYDROCHLORIDE 1000 MG/1
1000 TABLET ORAL 2 TIMES DAILY WITH MEALS
Qty: 60 TAB | Refills: 0 | Status: SHIPPED | OUTPATIENT
Start: 2020-05-05

## 2020-05-05 RX ORDER — HEPARIN SODIUM 1000 [USP'U]/ML
INJECTION, SOLUTION INTRAVENOUS; SUBCUTANEOUS AS NEEDED
Status: DISCONTINUED | OUTPATIENT
Start: 2020-05-05 | End: 2020-05-05 | Stop reason: HOSPADM

## 2020-05-05 RX ORDER — GUAIFENESIN 100 MG/5ML
81 LIQUID (ML) ORAL DAILY
Qty: 30 TAB | Refills: 2 | Status: SHIPPED | OUTPATIENT
Start: 2020-05-06

## 2020-05-05 RX ORDER — SODIUM CHLORIDE 9 MG/ML
50 INJECTION, SOLUTION INTRAVENOUS CONTINUOUS
Status: DISPENSED | OUTPATIENT
Start: 2020-05-05 | End: 2020-05-05

## 2020-05-05 RX ORDER — VERAPAMIL HYDROCHLORIDE 2.5 MG/ML
INJECTION, SOLUTION INTRAVENOUS AS NEEDED
Status: DISCONTINUED | OUTPATIENT
Start: 2020-05-05 | End: 2020-05-05 | Stop reason: HOSPADM

## 2020-05-05 RX ORDER — LIDOCAINE HYDROCHLORIDE 10 MG/ML
INJECTION INFILTRATION; PERINEURAL AS NEEDED
Status: DISCONTINUED | OUTPATIENT
Start: 2020-05-05 | End: 2020-05-05 | Stop reason: HOSPADM

## 2020-05-05 RX ORDER — SYRING-NEEDL,DISP,INSUL,0.3 ML 31 G X1/4"
1 SYRINGE, EMPTY DISPOSABLE MISCELLANEOUS DAILY
Qty: 100 SYRINGE | Refills: 0 | Status: SHIPPED | OUTPATIENT
Start: 2020-05-05

## 2020-05-05 RX ORDER — SODIUM CHLORIDE 0.9 % (FLUSH) 0.9 %
5-40 SYRINGE (ML) INJECTION AS NEEDED
Status: DISCONTINUED | OUTPATIENT
Start: 2020-05-05 | End: 2020-05-06 | Stop reason: HOSPADM

## 2020-05-05 RX ORDER — LOSARTAN POTASSIUM 25 MG/1
25 TABLET ORAL DAILY
Qty: 30 TAB | Refills: 2 | Status: SHIPPED | OUTPATIENT
Start: 2020-05-06

## 2020-05-05 RX ORDER — METOPROLOL SUCCINATE 100 MG/1
100 TABLET, EXTENDED RELEASE ORAL DAILY
Qty: 30 TAB | Refills: 2 | Status: SHIPPED | OUTPATIENT
Start: 2020-05-06

## 2020-05-05 RX ORDER — SODIUM CHLORIDE 0.9 % (FLUSH) 0.9 %
5-40 SYRINGE (ML) INJECTION EVERY 8 HOURS
Status: DISCONTINUED | OUTPATIENT
Start: 2020-05-05 | End: 2020-05-06 | Stop reason: HOSPADM

## 2020-05-05 RX ORDER — INSULIN GLARGINE 100 [IU]/ML
20 INJECTION, SOLUTION SUBCUTANEOUS DAILY
Qty: 3 ML | Refills: 0 | Status: SHIPPED | OUTPATIENT
Start: 2020-05-05

## 2020-05-05 RX ORDER — ISOSORBIDE DINITRATE 5 MG/1
5 TABLET ORAL 2 TIMES DAILY
Qty: 60 TAB | Refills: 1 | Status: SHIPPED | OUTPATIENT
Start: 2020-05-05

## 2020-05-05 RX ORDER — FENTANYL CITRATE 50 UG/ML
INJECTION, SOLUTION INTRAMUSCULAR; INTRAVENOUS AS NEEDED
Status: DISCONTINUED | OUTPATIENT
Start: 2020-05-05 | End: 2020-05-05 | Stop reason: HOSPADM

## 2020-05-05 RX ORDER — ATORVASTATIN CALCIUM 40 MG/1
40 TABLET, FILM COATED ORAL
Qty: 30 TAB | Refills: 2 | Status: SHIPPED | OUTPATIENT
Start: 2020-05-05

## 2020-05-05 RX ORDER — HEPARIN SODIUM 200 [USP'U]/100ML
INJECTION, SOLUTION INTRAVENOUS
Status: COMPLETED | OUTPATIENT
Start: 2020-05-05 | End: 2020-05-05

## 2020-05-05 RX ADMIN — INSULIN GLARGINE 20 UNITS: 100 INJECTION, SOLUTION SUBCUTANEOUS at 09:58

## 2020-05-05 RX ADMIN — ACETAMINOPHEN 650 MG: 325 TABLET ORAL at 16:45

## 2020-05-05 RX ADMIN — SPIRONOLACTONE 25 MG: 25 TABLET ORAL at 08:02

## 2020-05-05 RX ADMIN — METOPROLOL SUCCINATE 100 MG: 100 TABLET, EXTENDED RELEASE ORAL at 08:02

## 2020-05-05 RX ADMIN — HEPARIN SODIUM 5000 UNITS: 5000 INJECTION INTRAVENOUS; SUBCUTANEOUS at 21:42

## 2020-05-05 RX ADMIN — INSULIN LISPRO 7 UNITS: 100 INJECTION, SOLUTION INTRAVENOUS; SUBCUTANEOUS at 17:54

## 2020-05-05 RX ADMIN — ISOSORBIDE DINITRATE 5 MG: 10 TABLET ORAL at 21:41

## 2020-05-05 RX ADMIN — ISOSORBIDE DINITRATE 5 MG: 10 TABLET ORAL at 08:02

## 2020-05-05 RX ADMIN — INSULIN LISPRO 2 UNITS: 100 INJECTION, SOLUTION INTRAVENOUS; SUBCUTANEOUS at 17:54

## 2020-05-05 RX ADMIN — ASPIRIN 81 MG 81 MG: 81 TABLET ORAL at 08:02

## 2020-05-05 RX ADMIN — ATORVASTATIN CALCIUM 40 MG: 20 TABLET, FILM COATED ORAL at 21:41

## 2020-05-05 RX ADMIN — LOSARTAN POTASSIUM 25 MG: 25 TABLET ORAL at 08:02

## 2020-05-05 RX ADMIN — INSULIN LISPRO 4 UNITS: 100 INJECTION, SOLUTION INTRAVENOUS; SUBCUTANEOUS at 21:42

## 2020-05-05 NOTE — PROGRESS NOTES
Back from procedure, TR band intact to left wrist, c/o of soreness, ice pack applied to wrist area. Good n/v checks. Right groin venous sheath intact, no bleeding or swelling. 1730 denies wrist pain. 1830 Transferred to room 332 copy of cath procedure on cd in chart.

## 2020-05-05 NOTE — PROGRESS NOTES
Problem: Diabetes Self-Management  Goal: *Disease process and treatment process  Description: Define diabetes and identify own type of diabetes; list 3 options for treating diabetes. Outcome: Progressing Towards Goal  Goal: *Incorporating nutritional management into lifestyle  Description: Describe effect of type, amount and timing of food on blood glucose; list 3 methods for planning meals. Outcome: Progressing Towards Goal  Goal: *Incorporating physical activity into lifestyle  Description: State effect of exercise on blood glucose levels. Outcome: Progressing Towards Goal  Goal: *Developing strategies to promote health/change behavior  Description: Define the ABC's of diabetes; identify appropriate screenings, schedule and personal plan for screenings. Outcome: Progressing Towards Goal  Goal: *Using medications safely  Description: State effect of diabetes medications on diabetes; name diabetes medication taking, action and side effects. Outcome: Progressing Towards Goal  Goal: *Monitoring blood glucose, interpreting and using results  Description: Identify recommended blood glucose targets  and personal targets. Outcome: Progressing Towards Goal  Goal: *Prevention, detection, treatment of acute complications  Description: List symptoms of hyper- and hypoglycemia; describe how to treat low blood sugar and actions for lowering  high blood glucose level. Outcome: Progressing Towards Goal  Goal: *Prevention, detection and treatment of chronic complications  Description: Define the natural course of diabetes and describe the relationship of blood glucose levels to long term complications of diabetes.   Outcome: Progressing Towards Goal  Goal: *Developing strategies to address psychosocial issues  Description: Describe feelings about living with diabetes; identify support needed and support network  Outcome: Progressing Towards Goal  Goal: *Insulin pump training  Outcome: Progressing Towards Goal  Goal: *Sick day guidelines  Outcome: Progressing Towards Goal  Goal: *Patient Specific Goal (EDIT GOAL, INSERT TEXT)  Outcome: Progressing Towards Goal     Problem: Patient Education: Go to Patient Education Activity  Goal: Patient/Family Education  Outcome: Progressing Towards Goal     Problem: Falls - Risk of  Goal: *Absence of Falls  Description: Document Rocky Bare Fall Risk and appropriate interventions in the flowsheet.   Outcome: Progressing Towards Goal  Note: Fall Risk Interventions:       Mentation Interventions: Update white board, Increase mobility    Medication Interventions: Teach patient to arise slowly                   Problem: Patient Education: Go to Patient Education Activity  Goal: Patient/Family Education  Outcome: Progressing Towards Goal     Problem: Patient Education: Go to Patient Education Activity  Goal: Patient/Family Education  Outcome: Progressing Towards Goal

## 2020-05-05 NOTE — CDMP QUERY
Pt admitted with Chest pain. Conflicting documentation noted. Pt noted by cardiology Scarlet LANTIGUA MD 05/04/20 2013to have Acute on chronic systolic heart failure congestive heart failure and CHF chronic combined diastolic and systolic by Kylie Lal MD 05/05/20 6000 If possible, please document in progress notes and d/c summary if you are evaluating and /or treating any of the following: ? Acute on Chronic Systolic and Diastolic CHF ? Chronic Systolic and Diastolic CHF ? Chronic systolic heart failure 
? Other, please specify ? Clinically unable to determine The medical record reflects the following: 
 
  Risk Factors: Chronic CHF, NEW ONSET DM type II,  HTN Morbid obesity Clinical Indicators:  
 > CT No acute cardiopulmonary abnormality. 
 > Stress test 
 > Echo 
 
  Treatment: Receiving Echo, Stress test, Lasix IV and po Thank you, Julien Garcia, [address] [facility], [facility]

## 2020-05-05 NOTE — H&P
Date of Surgery Update:  Marycarmen Weeks was seen and examined. History and physical has been reviewed. The patient has been examined. There have been no significant clinical changes since the completion of the originally dated History and Physical.      Patient assessed and is candidate for moderate sedation.       Signed By: Sergey Arango MD     May 5, 2020 3:13 PM

## 2020-05-05 NOTE — ROUTINE PROCESS
TRANSFER - OUT REPORT: 
 
Verbal report given to SHIRA Alvarez RN(name) on Gorman Burkitt  being transferred to [address] [place] [address](unit) for routine progression of care Report consisted of patients Situation, Background, Assessment and  
Recommendations(SBAR). Information from the following report(s) SBAR, Procedure Summary, Intake/Output, MAR, Recent Results, Med Rec Status and Cardiac Rhythm sr was reviewed with the receiving nurse. Lines:  
Peripheral IV 05/05/20 Right Hand (Active) Site Assessment Clean, dry, & intact 5/5/2020 11:37 AM  
Phlebitis Assessment 0 5/5/2020 11:37 AM  
Infiltration Assessment 0 5/5/2020 11:37 AM  
Dressing Status Clean, dry, & intact 5/5/2020 11:37 AM  
Dressing Type Tape;Transparent 5/5/2020 11:37 AM  
Hub Color/Line Status Blue; Infusing;Capped;Flushed 5/5/2020 11:37 AM  
Action Taken Open ports on tubing capped 5/5/2020 11:37 AM  
Alcohol Cap Used Yes 5/5/2020 11:37 AM  
  
 
Opportunity for questions and clarification was provided. Patient transported with: 
 Monitor Registered Nurse

## 2020-05-05 NOTE — PROGRESS NOTES
Transition of care: anticipate home when medically cleared  Did not enter room due to covid restrictions  Telephone call with patient states he is having a cardiac catherization today. Patient lives with his sister and plans to return home when medically cleared  Patient has asked for [facility] when d/c to assist with education and instruction. Referral has been placed. Patient states he does not use any DME but he does have a glucometer he got since he was here at the hospital.  Patient has [employer] for insurance. Patient has Dr. Peters for pcp. he reports other wise he is IADL's. Cms will assist with follow up. No other needs at this time. Cm will continue to follow  Care Management Interventions  PCP Verified by CM:  Yes  Transition of Care Consult (CM Consult): [address]: Yes  Current Support Network: Relative's Home  Confirm Follow Up Transport: Family  The Plan for Transition of Care is Related to the Following Treatment Goals : patient   The Patient and/or Patient Representative was Provided with a Choice of Provider and Agrees with the Discharge Plan?: Yes  Freedom of Choice List was Provided with Basic Dialogue that Supports the Patient's Individualized Plan of Care/Goals, Treatment Preferences and Shares the Quality Data Associated with the Providers?: Yes  Discharge Location  Discharge Placement: Home with home health

## 2020-05-05 NOTE — PROGRESS NOTES
0700: Assumed care of pt from Cammy Ballesteros.  1310: Pt cleaned up and changed gown. 1048: Gave report to Jacquie Vásquez RN in care unit.

## 2020-05-05 NOTE — DISCHARGE INSTRUCTIONS
Cardiac Catheterization/Angiography Discharge Instructions    *Check the puncture site frequently for swelling or bleeding. If you see any bleeding, lie down and apply pressure over the area with a clean towel or washcloth. Notify your doctor for any redness, swelling, drainage or oozing from the puncture site. Notify your doctor for any fever or chills. *If the leg or arm with the puncture becomes cold, numb or painful, call Dr Pino Landis   *Activity should be limited for the next 48 hours. Climb stairs as little as possible and avoid any stooping, bending or strenuous activity for 48 hours. No heavy lifting (anything over 10 pounds) for three days. *Do not drive for 48 hours. *You may resume your usual diet. Drink more fluids than usual.    *Have a responsible person drive you home and stay with you for at least 24 hours after your heart catheterization/angiography. *You may remove the bandage from your Right, Left, Groin and Arm in 24 hours. You may shower in 24 hours. No tub baths, hot tubs or swimming for one week. Do not place any lotions, creams, powders, ointments over the puncture site for one week. You may place a clean band-aid over the puncture site each day for 5 days. Change this daily.

## 2020-05-05 NOTE — PROGRESS NOTES
Hospitalist Progress Note    Patient: Gorman Burkitt MRN: [id number]  CSN: [id number]    YOB: 1985  Age: 27 y.o. Sex: male    DOA: 4/30/2020 LOS:  LOS: 1 day          Chief Complaint:    Chest pain      Assessment/Plan       Chest pain  Abnormal stress test    Cardiac Cath today     AICD and cardiomyopathy , CHF chronic combined diastolic and systolic   Echo ef 96-11 %     Continue lasix, arb, BB, statin, and aspirin      NEW ONSET DM type II ,  with hyperglycemia   lantus 20 units,  recommed starting metformin after discharge BID  I think with dietary compliance and monitoring as well as basal insulin he can bring this under much better control quickly  ssi, diabetic diet , hypoglycemia protocol     home with insulin and metformin -meds sent to pharmacy  He has a glucometer      HTN  Continue home medication.     Hyponatremia -pseudo with hyperglycemia  improved     Hyperlipidemia: continue statin      Morbid obesity   Continue lifestyle modification     Discharge after clearance per cardiology    Disposition :  Patient Active Problem List   Diagnosis Code    Essential hypertension, benign I10    Cardiomyopathy ([facility] I42.9    Combined systolic and diastolic congestive heart failure (HCC) I50.40    Poor compliance Z91.19    AICD (automatic cardioverter/defibrillator) present Z95.810    Elevated liver enzymes R74.8    Chest pain R07.9    Hyperglycemia due to type 2 diabetes mellitus ([facility] E11.65    Morbid obesity ([facility] E66.01    Hyperlipemia E78.5       Subjective:  Denies new issue        Review of systems:    Constitutional: denies fevers, chills  Respiratory: denies SOB  Cardiovascular: denies chest pain, palpitations  Gastrointestinal: denies nausea      Vital signs/Intake and Output:  Visit Vitals  /74   Pulse 70   Temp 97.4 °F (36.3 °C)   Resp 17   Ht 5' 3\" (1.6 m)   Wt 95.3 kg (210 lb)   SpO2 100%   BMI 37.20 kg/m²     Current Shift:  No intake/output data recorded.   Last three shifts:  05/03 1901 - 05/05 0700  In: 1360 [P.O.:1360]  Out: 1275 [Urine:1275]    Exam:    General: Well developed, alert, NAD, OX3  CVS:Regular rate and rhythm, no M/R/G, S1/S2 heard, no thrill  Lungs:Clear to auscultation bilaterally, no wheezes, rhonchi, or rales  Abdomen: Soft, Nontender, No distention, Normal Bowel sounds, No hepatomegaly  Extremities: No C/C/E, pulses palpable 2+  Neuro:grossly normal , follows commands  Psych:appropriate                Labs: Results:       Chemistry Recent Labs     05/05/20 0435 05/04/20  0320 05/03/20  0410   * 254* 236*   * 133* 134*   K 4.3 3.8 3.7   CL 98* 95* 95*   CO2 30 31 31   BUN 14 15 16   CREA 0.91 0.94 0.96   CA 9.0 8.9 8.9   AGAP 5 7 8   BUCR 15 16 17   AP 78 88  --    TP 7.0 7.1  --    ALB 3.7 3.7  --    GLOB 3.3 3.4  --    AGRAT 1.1 1.1  --       CBC w/Diff Recent Labs     05/05/20 0435   WBC 9.0   RBC 5.29   HGB 15.0   HCT 43.5      GRANS 61   LYMPH 29   EOS 2      Cardiac Enzymes No results for input(s): CPK, CKND1, CARLOS ENRIQUE in the last 72 hours. No lab exists for component: CKRMB, TROIP   Coagulation Recent Labs     05/05/20 0435   PTP 13.8   INR 1.1       Lipid Panel Lab Results   Component Value Date/Time    Cholesterol, total 218 (H) 05/01/2020 04:50 AM    HDL Cholesterol 27 (L) 05/01/2020 04:50 AM    LDL, calculated 142.2 (H) 05/01/2020 04:50 AM    VLDL, calculated 48.8 05/01/2020 04:50 AM    Triglyceride 244 (H) 05/01/2020 04:50 AM    CHOL/HDL Ratio 8.1 (H) 05/01/2020 04:50 AM      BNP No results for input(s): BNPP in the last 72 hours.    Liver Enzymes Recent Labs     05/05/20 0435   TP 7.0   ALB 3.7   AP 78   SGOT 30      Thyroid Studies Lab Results   Component Value Date/Time    TSH 1.54 11/17/2011 01:20 AM        Procedures/imaging: see electronic medical records for all procedures/Xrays and details which were not copied into this note but were reviewed prior to creation of Yaneli Li MD

## 2020-05-05 NOTE — ROUTINE PROCESS
Bedside shift change report given to Darlene Goldmann RN (oncoming nurse) by Inez Ochoa RN (offgoing nurse). Report included the following information SBAR, Kardex, Intake/Output and MAR.

## 2020-05-05 NOTE — PROGRESS NOTES
Tr removed from left wrist area, no active drainage noted, 2x2 and tegaderm applied to site. Neuro/vasc assessment WNL  Sheath removed from right groin area.  Site clean, 2x2 and tegaderm applied to site,   Dinner tray given

## 2020-05-05 NOTE — PROGRESS NOTES
Cardiology Progress Note        Patient: Liz Pelaez        Sex: male          DOA: 4/30/2020  YOB: 1985      Age:  27 y.o.        LOS:  LOS: 0 days    Patient seen and examined, chart reviewed. Assessment/Plan     Patient Active Problem List   Diagnosis Code    Essential hypertension, benign I10    Cardiomyopathy ([facility] I42.9    Combined systolic and diastolic congestive heart failure ([facility] I50.40         AICD (automatic cardioverter/defibrillator) present Z95.810    Elevated liver enzymes R74.8    Chest pain R07.9    Hyperglycemia due to type 2 diabetes mellitus ([facility] E11.65    Morbid obesity (HCC) E66.01    Hyperlipemia E78.5       Angina class III  Acute on chronic systolic heart failure  Abnormal stress test    Stress test revealed    1) Exercise perfusion/Lexiscan stress test is abnormal and positive for ischemia. 2) The patient performed treadmill exercise using a Sudhir protocol, completing 4:00 minutes and completing an estimated workload of 4.4 metabolic equivalents (METS). 3) The test was terminated due to fatigue,shortness of breath, chest pain and patient requested to stop. 4) The resting heart rate was 114 beats per minute at baseline and increased to 148 beats per minute at peak exercise, which was 80 % of the maximum predicted heart rate. The resting blood pressure was 150/80 mm/Hg and increased to 160/90 mm/Hg, which is a normal response. Due to sub optimal heart rate test was changed to Lexiscan stress test.    5) Baseline EKG revealed Sinus rhythm, ST T changes in inferolateral leads. There were no ST changes during exercise, post regadenoson or in recovery. 6)  There was severe reversible perfusion defect of medium size in apical and mid inferior wall and severe perfusion defect of small to medium size in apical wall. 7) Global hypokinesis with calculated LVEF 33%. TID 1.04  8) Poor exercise capacity.    9) No previous study to compare. Plan:    Continue aspirin, metoprolol succinate, losartan, spinal Aldactone, isosorbide dinitrate and atorvastatin. Fluid restriction up to 1.2 l/day and salt restriction up to 2 gm/day. In view of chest pain typical of angina class III with abnormal stress test and acute on chronic systolic heart failure advised about LEFT heart catheterization, RIGHT heart catheterization, coronary angiogram plus or minus PCI. All risks, benefits and alternatives explained to patient and he verbally understood. Patient agreed for procedure. NPO after midnight. Continue management as per hospital medicine. Further recommendation after cardiac catheterization. Subjective:    cc:  C/o chest pain and shortness of breath on exertion       REVIEW OF SYSTEMS:     General: No fevers or chills. Cardiovascular: Positive chest pain,shortness of breath on exertion, No palpitations, No orthopnea, No PND, No leg swelling, No claudication  Pulmonary: No dyspnea  Gastrointestinal: No nausea, vomiting, bleeding  Neurology: No Dizziness    Objective:      Visit Vitals  /67 (BP 1 Location: Right arm, BP Patient Position: At rest)   Pulse 98   Temp 98.5 °F (36.9 °C)   Resp 16   Ht 5' 3\" (1.6 m)   Wt 107 kg (235 lb 14.3 oz)   SpO2 98%   BMI 41.79 kg/m²     Body mass index is 41.79 kg/m². Physical Exam:  General Appearance: Comfortable, not using accessory muscles of respiration. HEENT: FER. HEAD: Atraumatic  NECK: No JVD, no thyroidomeglay. CAROTIDS: No bruit  LUNGS: Clear bilaterally. HEART: S1+S2 audible, no murmur, no pericardial rub. ABD: Non-tender, BS Audible    EXT: No edema, and no cyanosis.   NEUROLOGICAL: AAO times 3, No motor and sensory deficit    Medication:  Current Facility-Administered Medications   Medication Dose Route Frequency    [START ON 5/5/2020] insulin glargine (LANTUS) injection 20 Units  20 Units SubCUTAneous DAILY    insulin lispro (HUMALOG) injection 7 Units  7 Units SubCUTAneous TIDAC    losartan (COZAAR) tablet 25 mg  25 mg Oral DAILY    isosorbide dinitrate (ISORDIL) tablet 5 mg  5 mg Oral BID    atorvastatin (LIPITOR) tablet 40 mg  40 mg Oral QHS    metoprolol succinate (TOPROL-XL) tablet 100 mg  100 mg Oral DAILY    aspirin chewable tablet 81 mg  81 mg Oral DAILY    nitroglycerin (NITROSTAT) tablet 0.4 mg  0.4 mg SubLINGual Q5MIN PRN    acetaminophen (TYLENOL) tablet 650 mg  650 mg Oral Q4H PRN    morphine injection 1 mg  1 mg IntraVENous Q4H PRN    heparin (porcine) injection 5,000 Units  5,000 Units SubCUTAneous Q8H    spironolactone (ALDACTONE) tablet 25 mg  25 mg Oral DAILY    insulin lispro (HUMALOG) injection   SubCUTAneous AC&HS    glucose chewable tablet 16 g  4 Tab Oral PRN    glucagon (GLUCAGEN) injection 1 mg  1 mg IntraMUSCular PRN               Lab/Data Reviewed:       No results for input(s): WBC, HGB, HCT, PLT, HGBEXT, HCTEXT, PLTEXT in the last 72 hours.   Recent Labs     05/04/20  0320 05/03/20  0410 05/02/20  0510   * 134* 132*   K 3.8 3.7 3.9   CL 95* 95* 95*   CO2 31 31 32   * 236* 203*   BUN 15 16 14   CREA 0.94 0.96 0.93   CA 8.9 8.9 9.5       Signed By: Anirudh Sargent MD     May 4, 2020

## 2020-05-06 VITALS
HEART RATE: 70 BPM | BODY MASS INDEX: 37.95 KG/M2 | SYSTOLIC BLOOD PRESSURE: 123 MMHG | TEMPERATURE: 98.4 F | OXYGEN SATURATION: 97 % | RESPIRATION RATE: 18 BRPM | DIASTOLIC BLOOD PRESSURE: 81 MMHG | HEIGHT: 63 IN | WEIGHT: 214.2 LBS

## 2020-05-06 LAB
ALBUMIN SERPL-MCNC: 3.4 G/DL (ref 3.4–5)
ALBUMIN/GLOB SERPL: 1 {RATIO} (ref 0.8–1.7)
ALP SERPL-CCNC: 72 U/L (ref 45–117)
ALT SERPL-CCNC: 67 U/L (ref 16–61)
ANION GAP SERPL CALC-SCNC: 5 MMOL/L (ref 3–18)
AST SERPL-CCNC: 31 U/L (ref 10–38)
BASOPHILS # BLD: 0.1 K/UL (ref 0–0.1)
BASOPHILS NFR BLD: 1 % (ref 0–2)
BILIRUB SERPL-MCNC: 0.8 MG/DL (ref 0.2–1)
BUN SERPL-MCNC: 12 MG/DL (ref 7–18)
BUN/CREAT SERPL: 13 (ref 12–20)
CALCIUM SERPL-MCNC: 8.8 MG/DL (ref 8.5–10.1)
CHLORIDE SERPL-SCNC: 101 MMOL/L (ref 100–111)
CO2 SERPL-SCNC: 29 MMOL/L (ref 21–32)
CREAT SERPL-MCNC: 0.9 MG/DL (ref 0.6–1.3)
DIFFERENTIAL METHOD BLD: NORMAL
EOSINOPHIL # BLD: 0.2 K/UL (ref 0–0.4)
EOSINOPHIL NFR BLD: 2 % (ref 0–5)
ERYTHROCYTE [DISTWIDTH] IN BLOOD BY AUTOMATED COUNT: 13.6 % (ref 11.6–14.5)
GLOBULIN SER CALC-MCNC: 3.3 G/DL (ref 2–4)
GLUCOSE BLD STRIP.AUTO-MCNC: 137 MG/DL (ref 70–110)
GLUCOSE SERPL-MCNC: 146 MG/DL (ref 74–99)
HCT VFR BLD AUTO: 40.9 % (ref 36–48)
HGB BLD-MCNC: 13.9 G/DL (ref 13–16)
INR PPP: 1.1 (ref 0.8–1.2)
LYMPHOCYTES # BLD: 2.3 K/UL (ref 0.9–3.6)
LYMPHOCYTES NFR BLD: 30 % (ref 21–52)
MCH RBC QN AUTO: 28.1 PG (ref 24–34)
MCHC RBC AUTO-ENTMCNC: 34 G/DL (ref 31–37)
MCV RBC AUTO: 82.8 FL (ref 74–97)
MONOCYTES # BLD: 0.5 K/UL (ref 0.05–1.2)
MONOCYTES NFR BLD: 7 % (ref 3–10)
NEUTS SEG # BLD: 4.8 K/UL (ref 1.8–8)
NEUTS SEG NFR BLD: 60 % (ref 40–73)
PLATELET # BLD AUTO: 230 K/UL (ref 135–420)
PMV BLD AUTO: 11.8 FL (ref 9.2–11.8)
POTASSIUM SERPL-SCNC: 4.2 MMOL/L (ref 3.5–5.5)
PROT SERPL-MCNC: 6.7 G/DL (ref 6.4–8.2)
PROTHROMBIN TIME: 13.7 SEC (ref 11.5–15.2)
RBC # BLD AUTO: 4.94 M/UL (ref 4.7–5.5)
SODIUM SERPL-SCNC: 135 MMOL/L (ref 136–145)
STRESS ANGINA INDEX: 2
STRESS BASELINE HR: 114 BPM
STRESS ESTIMATED WORKLOAD: 1.6 METS
STRESS EXERCISE DUR MIN: NORMAL
STRESS PEAK DIAS BP: 102 MMHG
STRESS PEAK SYS BP: 169 MMHG
STRESS PERCENT HR ACHIEVED: 80 %
STRESS POST PEAK HR: 148 BPM
STRESS RATE PRESSURE PRODUCT: NORMAL BPM*MMHG
STRESS SR DUKE TREADMILL SCORE: -8
STRESS ST DEPRESSION: 0 MM
STRESS ST ELEVATION: 0 MM
STRESS TARGET HR: 185 BPM
WBC # BLD AUTO: 7.9 K/UL (ref 4.6–13.2)

## 2020-05-06 PROCEDURE — 74011250636 HC RX REV CODE- 250/636: Performed by: HOSPITALIST

## 2020-05-06 PROCEDURE — 85610 PROTHROMBIN TIME: CPT

## 2020-05-06 PROCEDURE — 82962 GLUCOSE BLOOD TEST: CPT

## 2020-05-06 PROCEDURE — 74011636637 HC RX REV CODE- 636/637: Performed by: HOSPITALIST

## 2020-05-06 PROCEDURE — 80053 COMPREHEN METABOLIC PANEL: CPT

## 2020-05-06 PROCEDURE — 74011250637 HC RX REV CODE- 250/637: Performed by: INTERNAL MEDICINE

## 2020-05-06 PROCEDURE — 36415 COLL VENOUS BLD VENIPUNCTURE: CPT

## 2020-05-06 PROCEDURE — 74011250637 HC RX REV CODE- 250/637: Performed by: HOSPITALIST

## 2020-05-06 PROCEDURE — 85025 COMPLETE CBC W/AUTO DIFF WBC: CPT

## 2020-05-06 RX ADMIN — INSULIN GLARGINE 20 UNITS: 100 INJECTION, SOLUTION SUBCUTANEOUS at 09:00

## 2020-05-06 RX ADMIN — METOPROLOL SUCCINATE 100 MG: 100 TABLET, EXTENDED RELEASE ORAL at 09:01

## 2020-05-06 RX ADMIN — HEPARIN SODIUM 5000 UNITS: 5000 INJECTION INTRAVENOUS; SUBCUTANEOUS at 06:53

## 2020-05-06 RX ADMIN — INSULIN LISPRO 7 UNITS: 100 INJECTION, SOLUTION INTRAVENOUS; SUBCUTANEOUS at 08:59

## 2020-05-06 RX ADMIN — SPIRONOLACTONE 25 MG: 25 TABLET ORAL at 09:00

## 2020-05-06 RX ADMIN — ASPIRIN 81 MG 81 MG: 81 TABLET ORAL at 09:00

## 2020-05-06 RX ADMIN — ISOSORBIDE DINITRATE 5 MG: 10 TABLET ORAL at 09:00

## 2020-05-06 RX ADMIN — LOSARTAN POTASSIUM 25 MG: 25 TABLET ORAL at 09:00

## 2020-05-06 NOTE — PROCEDURES
LHC and Coronary angiogram done via left radial approach. RHC done via right femoral vein. Coronary anatomy described below with noted coronary artery disease. Coronary angiogram revealed critical triple vessel disease. LV gram was not done. LVEDP 8 mm hg. No significant gradient on pull back. RHC revealed PCWP 9 mm hg, mean PA pressure 24 mm hg, RV pressure 37/4/12 mm hg, mean RA pressure 7 mm hg.     Cardiac out put by Silvestre's method 4.17 l/min, Cardiac index 2.11 l/min/m2. PVR 3.59 woods unit. Patient tolerated procedure well. Scant blood loss. No complications. No specimen removed. Recommendation:    Medication considered:   Aspirin, beta blocker, ACEI, Nitrates and statin     CAD risk factor education  Diet education  Control cholesterol  Blood pressure control  CT surgery consult for CABG evaluation. Case discussed with Timo Gunter at [address] [place] [address], he will review images.

## 2020-05-06 NOTE — DISCHARGE SUMMARY
Discharge Summary    Patient: Jagdish Maldonado MRN: [id number]  CSN: [id number]    YOB: 1985  Age: 27 y.o. Sex: male    DOA: 4/30/2020 LOS:  LOS: 2 days   Discharge Date:      Primary Care Provider: Kevin Gonzalez DO    Admission Diagnoses: Chest pain [R07.9]  Chest pain [R07.9]    Discharge Diagnoses:    Hospital Problems  Date Reviewed: 2/2/2015          Codes Class Noted POA    Morbid obesity ([facility] ICD-10-CM: E66.01  ICD-9-CM: 278.01  5/2/2020 Unknown        Hyperlipemia ICD-10-CM: E78.5  ICD-9-CM: 272.4  5/2/2020 Unknown        * (Principal) Chest pain ICD-10-CM: R07.9  ICD-9-CM: 786.50  4/30/2020 Unknown        Hyperglycemia due to type 2 diabetes mellitus ([facility] ICD-10-CM: E11.65  ICD-9-CM: 250.00  4/30/2020         AICD (automatic cardioverter/defibrillator) present ICD-10-CM: Z95.810  ICD-9-CM: V45.02  9/19/2014 Yes    Overview Signed 9/19/2014  9:50 AM by Susy Iglesias MD     Dual chamber Medtronic AICD for primary prevention 9/19/14             Essential hypertension, benign ICD-10-CM: I10  ICD-9-CM: 401.1  Unknown Yes        Cardiomyopathy ([facility] ICD-10-CM: I42.9  ICD-9-CM: 425.4  12/1/2011 Yes        Combined systolic and diastolic congestive heart failure ([facility] ICD-10-CM: I50.40  ICD-9-CM: 428.40  12/1/2011 Yes              Discharge Condition: stable     Discharge Medications:     Current Discharge Medication List      START taking these medications    Details   insulin syringe-needle U-100 0.3 mL 31 gauge x 1/4\" syrg 1 Each by Does Not Apply route daily. Qty: 100 Syringe, Refills: 0      insulin glargine (Lantus Solostar U-100 Insulin) 100 unit/mL (3 mL) inpn 20 Units by SubCUTAneous route daily. Qty: 3 mL, Refills: 0      aspirin 81 mg chewable tablet Take 1 Tab by mouth daily. Qty: 30 Tab, Refills: 2      atorvastatin (LIPITOR) 40 mg tablet Take 1 Tab by mouth nightly.   Qty: 30 Tab, Refills: 2      isosorbide dinitrate (ISORDIL) 5 mg tablet Take 1 Tab by mouth two (2) times a day. Qty: 60 Tab, Refills: 1      metFORMIN (GLUCOPHAGE) 1,000 mg tablet Take 1 Tab by mouth two (2) times daily (with meals). Qty: 60 Tab, Refills: 0      Diabetic Supplies, Miscellan. kit 1 Each by Does Not Apply route daily. Qty: 1 Kit, Refills: 0         CONTINUE these medications which have CHANGED    Details   metoprolol succinate (TOPROL-XL) 100 mg tablet Take 1 Tab by mouth daily. Qty: 30 Tab, Refills: 2      losartan (COZAAR) 25 mg tablet Take 1 Tab by mouth daily. Qty: 30 Tab, Refills: 2         CONTINUE these medications which have NOT CHANGED    Details   spironolactone (ALDACTONE) 25 mg tablet Take 1 Tab by mouth daily. Qty: 90 Tab, Refills: 3    Associated Diagnoses: CHF (congestive heart failure) ([facility] Essential hypertension, benign      furosemide (LASIX) 40 mg tablet Take 1 Tab by mouth daily. Qty: 90 Tab, Refills: 3    Associated Diagnoses: CHF (congestive heart failure) ([facility] Essential hypertension, benign             Procedures : left heart cath     Consults: Cardiology      PHYSICAL EXAM   Visit Vitals  /81 (BP 1 Location: Right arm, BP Patient Position: At rest)   Pulse 70   Temp 98.4 °F (36.9 °C)   Resp 18   Ht 5' 3\" (1.6 m)   Wt 97.2 kg (214 lb 3.2 oz)   SpO2 97%   BMI 37.94 kg/m²     General: Awake, cooperative, no acute distress    HEENT: NC, Atraumatic. PERRLA, EOMI. Anicteric sclerae. Lungs:  CTA Bilaterally. No Wheezing/Rhonchi/Rales. Heart:  Regular  rhythm,  No murmur, No Rubs, No Gallops  Abdomen: Soft, Non distended, Non tender. +Bowel sounds,   Extremities: No c/c/e  Psych:   Not anxious or agitated. Neurologic:  No acute neurological deficits. Admission HPI :   Sneha Kan is a 27 y.o. male with PMHX of hypertension, cardiomyopathy, AICD came to ER due to on and off chest pain for 2 weeks. He reported he had chest pain on exertion, resolved per resting. Associated with shortness of breath.   Chest pain located middle of the chest.  No radiation, it is dull pain. No leg swelling. He follow-up with Dr. Luis Nieto cardiologist for this heart problem. He called cardiologist in the recommended to come to the hospital or office if symptoms become worse. Today, his chest pain became worsening while he was walking a dog. He denies any fever chills, no COVID 19 contact. His first troponin was normal, proBNP normal.  Chest  x-ray was clear. He was found glucose over 300. He denies any history of diabetes. He has no primary care physician. But he said he has a strong family history of diabetes. Hospital Course :   An Healy is a 27 y.o. male with PMHX of hypertension, cardiomyopathy, AICD was admitted due to angina. acs was ruled out. Cardiologist was on board, stress test was positive. Left heart cath was performed, and need CABG. Dr. Seb Duran discussed with Criselda Villagomez at [address] [place] [address]. He was accepted per   at [address] [place] [address]. He remained hemodynamic stable during his stay. He has uncontrolled DM, not on medication, his dm was controlled per insulin and received education for DM. Activity: Bedrest    Diet: NPO    Follow-up: per future physician     [facility]      Minutes spent on discharge: 45 min       Labs: Results:       Chemistry Recent Labs     05/06/20 0353 05/05/20  0435 05/04/20  0320   * 170* 254*   * 133* 133*   K 4.2 4.3 3.8    98* 95*   CO2 29 30 31   BUN 12 14 15   CREA 0.90 0.91 0.94   CA 8.8 9.0 8.9   AGAP 5 5 7   BUCR 13 15 16   AP 72 78 88   TP 6.7 7.0 7.1   ALB 3.4 3.7 3.7   GLOB 3.3 3.3 3.4   AGRAT 1.0 1.1 1.1      CBC w/Diff Recent Labs     05/06/20 0353 05/05/20  0435   WBC 7.9 9.0   RBC 4.94 5.29   HGB 13.9 15.0   HCT 40.9 43.5    240   GRANS 60 61   LYMPH 30 29   EOS 2 2      Cardiac Enzymes No results for input(s): CPK, CKND1, CARLOS ENRIQUE in the last 72 hours.     No lab exists for component: Tommy Hidalgo   Coagulation Recent Labs     05/06/20  0353 05/05/20  0435   PTP 13.7 13.8   INR 1.1 1.1       Lipid Panel Lab Results   Component Value Date/Time    Cholesterol, total 218 (H) 05/01/2020 04:50 AM    HDL Cholesterol 27 (L) 05/01/2020 04:50 AM    LDL, calculated 142.2 (H) 05/01/2020 04:50 AM    VLDL, calculated 48.8 05/01/2020 04:50 AM    Triglyceride 244 (H) 05/01/2020 04:50 AM    CHOL/HDL Ratio 8.1 (H) 05/01/2020 04:50 AM      BNP No results for input(s): BNPP in the last 72 hours. Liver Enzymes Recent Labs     05/06/20  0353   TP 6.7   ALB 3.4   AP 72   SGOT 31      Thyroid Studies Lab Results   Component Value Date/Time    TSH 1.54 11/17/2011 01:20 AM          @micro    Significant Diagnostic Studies: Xr Chest Port    Result Date: 4/30/2020  EXAM: XR CHEST PORT CLINICAL INDICATION/HISTORY: SOB -Additional: None COMPARISON: 9/20/14 TECHNIQUE: Portable frontal view of the chest _______________ FINDINGS: SUPPORT DEVICES: None. HEART AND MEDIASTINUM: Left chest wall AICD/pacemaker. Cardiomediastinal silhouette within normal limits. LUNGS AND PLEURAL SPACES: No dense consolidation, large effusion or pneumothorax. _______________     IMPRESSION: No acute cardiopulmonary abnormality. Dariel [facility]     CC:  Yamileth Sanon DO

## 2020-05-06 NOTE — PROGRESS NOTES
1915 Pt received from offgoing nurse without any signs or symptoms of distress. Pt vitals are stable and within normal limits. Pt bed in low position with wheels locked and call bell within reach. 2000 Assessment completed and documented in flow sheet. Pt denies any further needs at this time. Pt in NAD with bed in low position, wheels locked and call bell within reach. Purposeful rounding completed. Pt resting quietly. No further needs voiced at this time. 2141 Scheduled medications administered as ordered. Purposeful rounding completed. Pt resting quietly. No further needs voiced at this time. 2330 Reassessment completed with no changes noted. Bed locked, in lowest position, with call light within reach. Purposeful rounding completed. Pt resting quietly. No further needs voiced at this time. 0230 Purposeful rounding completed. Pt resting quietly. No further needs voiced at this time. 8891 Reassessment completed with no changes noted. Bed locked, in lowest position, with call light within reach. Purposeful rounding completed. Pt resting quietly. No further needs voiced at this time. 6324 Scheduled medications administered as ordered. 0710 Bedside and Verbal shift change report given to Gianna Flores (oncoming nurse) by Angus Pinon RN   (offgoing nurse). Report included the following information SBAR, Intake/Output, MAR and Recent Results.

## 2020-05-06 NOTE — PROGRESS NOTES
Problem: Falls - Risk of  Goal: *Absence of Falls  Description: Document Seymour Reason Fall Risk and appropriate interventions in the flowsheet.   Outcome: Progressing Towards Goal  Note: Fall Risk Interventions:       Mentation Interventions: Adequate sleep, hydration, pain control, Evaluate medications/consider consulting pharmacy, Increase mobility, More frequent rounding, Reorient patient, Toileting rounds, Update white board, Room close to nurse's station    Medication Interventions: Evaluate medications/consider consulting pharmacy, Patient to call before getting OOB, Teach patient to arise slowly                   Problem: Patient Education: Go to Patient Education Activity  Goal: Patient/Family Education  Outcome: Progressing Towards Goal

## 2020-05-06 NOTE — PROGRESS NOTES
0700 Assumed patient care off-going nurse Evelio Bonilla RN. Whiteboard updated, bed wheels locked, bed in lowest position, and call bell within reach. 0800 Assessment complete. Patient resting comfortably in bed. No complaint of pain or SOB at this time. Call bell within reach. Puncture sites are both dry, clean, no hematoma, and no bleeding. 36 Patient has received bed assignment for [facility]. Dr. Pietro Sosa is aware and EMTALA in process, CD of imaging in chart. 1130 Patient discharged with transport to [facility].

## 2020-05-06 NOTE — PROGRESS NOTES
Transition of care: home when medically cleared  Did not enter room due to covid restrictions  Telephone call with patient to room. He informed cm that he had his cardiac cath done yesterday and it has 100% blockage. States he is waiting on a bed [facility]. Telephone call with Layla Ontiveros RN she informed cm that patient does have a bed at [facility]. Patient is going to [address] [place], [place] [address]Th [facility] 10th floor [place] [address]. Layla Ontiveros has the phone number  To call report to [phone]. Please call nursing supervisor to assist with EMTALA. Patient reports he has called his mother and sister to let them know he is being transferred to [facility]. Patient to be transferred ALS. Patient lives with his sister  Patient has been dx as new diabetic and has been provided with a glucometer while in hospital.  Patient denies other needs. [place] is arranging for transportation  Care Management Interventions  PCP Verified by CM: Yes  Mode of Transport at Discharge: ALS  Transition of Care Consult (CM Consult):  Other  [address]: Yes  Current Support Network: Relative's Home  Confirm Follow Up Transport: Other (see comment)  The Plan for Transition of Care is Related to the Following Treatment Goals : Transfer to [facility]   The Patient and/or Patient Representative was Provided with a Choice of Provider and Agrees with the Discharge Plan?: Yes  Freedom of Choice List was Provided with Basic Dialogue that Supports the Patient's Individualized Plan of Care/Goals, Treatment Preferences and Shares the Quality Data Associated with the Providers?: Yes  Discharge Location  Discharge Placement: Transferred to higher level of care

## 2022-02-11 ENCOUNTER — HOSPITAL ENCOUNTER (OUTPATIENT)
Dept: LAB | Age: 37
Discharge: HOME | End: 2022-02-11
Payer: COMMERCIAL

## 2022-02-11 LAB
ANION GAP SERPL CALC-SCNC: 5 MMOL/L (ref 3–18)
BUN SERPL-MCNC: 11 MG/DL (ref 7–18)
BUN/CREAT SERPL: 12 (ref 12–20)
CALCIUM SERPL-MCNC: 9.6 MG/DL (ref 8.5–10.1)
CHLORIDE SERPL-SCNC: 103 MMOL/L (ref 100–111)
CO2 SERPL-SCNC: 29 MMOL/L (ref 21–32)
CREAT SERPL-MCNC: 0.91 MG/DL (ref 0.6–1.3)
GLUCOSE SERPL-MCNC: 327 MG/DL (ref 74–99)
POTASSIUM SERPL-SCNC: 4.2 MMOL/L (ref 3.5–5.5)
SODIUM SERPL-SCNC: 137 MMOL/L (ref 136–145)

## 2022-02-11 PROCEDURE — 80048 BASIC METABOLIC PNL TOTAL CA: CPT

## 2022-02-11 PROCEDURE — 36415 COLL VENOUS BLD VENIPUNCTURE: CPT

## (undated) DEVICE — SENSOR PLSE OXMTR AD CBL L36IN ADH FRM FIT SPO2 DISP

## (undated) DEVICE — GLIDESHEATH SLENDER STAINLESS STEEL KIT: Brand: GLIDESHEATH SLENDER

## (undated) DEVICE — CATHETER ANGIO 4FR L100CM S STL NYL MPA 2 W/ 2 SIDE H 3 SEG

## (undated) DEVICE — Device

## (undated) DEVICE — ANGIOGRAPHIC CATHETER: Brand: EXPO™

## (undated) DEVICE — TUBING PRSS MON L24IN PVC RIG NONEXPANDING M TO FEM CONN

## (undated) DEVICE — BAND RADIAL COMPR ARTERY 27CM -- LNG BX/10

## (undated) DEVICE — PRESSURE MONITORING SET: Brand: TRUWAVE

## (undated) DEVICE — STOPCOCK TRNSDUC 500PSI 3 W ROT M LUER LT BLU OFF HNDL R

## (undated) DEVICE — DRAPE,ANGIO,BRACH,STERILE,38X44: Brand: MEDLINE

## (undated) DEVICE — GUIDEWIRE VASC L260CM DIA0.035IN RAD 3MM J TIP L7CM PTFE

## (undated) DEVICE — TORCON NB, ADVANTAGE CATHETER: Brand: TORCON NB

## (undated) DEVICE — PACK PROCEDURE SURG CATH CUST

## (undated) DEVICE — SHIELD RAD 14X16 IN W/ SCOOP ABSORBER

## (undated) DEVICE — PROCEDURE KIT FLUID MGMT 10 FR CUST MAINFOLD

## (undated) DEVICE — CATHETER DIAG AD 4FR L100CM STD NYL JUDKINS R 4 TRULUMEN

## (undated) DEVICE — SWAN-GANZ POLYMER BLEND TRUE SIZE C-TIP CONTROLCATH TD CATHETER: Brand: SWAN-GANZ CONTROLCATH TRUE SIZE

## (undated) DEVICE — PINNACLE PRECISION ACCESS SYSTEM INTRODUCER SHEATH: Brand: PINNACLE PRECISION ACCESS SYSTEM